# Patient Record
Sex: FEMALE | Race: WHITE | NOT HISPANIC OR LATINO | Employment: FULL TIME | URBAN - NONMETROPOLITAN AREA
[De-identification: names, ages, dates, MRNs, and addresses within clinical notes are randomized per-mention and may not be internally consistent; named-entity substitution may affect disease eponyms.]

---

## 2018-08-08 ENCOUNTER — OFFICE VISIT (OUTPATIENT)
Dept: BEHAVIORAL HEALTH | Facility: CLINIC | Age: 53
End: 2018-08-08

## 2018-08-08 DIAGNOSIS — F90.0 ADHD, PREDOMINANTLY INATTENTIVE TYPE: ICD-10-CM

## 2018-08-08 DIAGNOSIS — F41.1 GENERALIZED ANXIETY DISORDER: Primary | ICD-10-CM

## 2018-08-08 PROCEDURE — 90791 PSYCH DIAGNOSTIC EVALUATION: CPT | Performed by: PSYCHOLOGIST
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
## 2018-08-15 ENCOUNTER — OFFICE VISIT (OUTPATIENT)
Dept: BEHAVIORAL HEALTH | Facility: CLINIC | Age: 53
End: 2018-08-15

## 2018-08-15 DIAGNOSIS — F41.1 GENERALIZED ANXIETY DISORDER: ICD-10-CM

## 2018-08-15 DIAGNOSIS — F90.0 ADHD, PREDOMINANTLY INATTENTIVE TYPE: Primary | ICD-10-CM

## 2018-08-15 PROCEDURE — 90834 PSYTX W PT 45 MINUTES: CPT | Performed by: PSYCHOLOGIST
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
## 2018-10-18 ENCOUNTER — TRANSCRIBE ORDERS (OUTPATIENT)
Dept: MRI IMAGING | Facility: HOSPITAL | Age: 53
End: 2018-10-18

## 2018-10-18 DIAGNOSIS — M54.2 CERVICALGIA: Primary | ICD-10-CM

## 2019-05-28 ENCOUNTER — PROCEDURE VISIT (OUTPATIENT)
Dept: PULMONOLOGY | Facility: CLINIC | Age: 54
End: 2019-05-28

## 2019-05-28 ENCOUNTER — DOCUMENTATION (OUTPATIENT)
Dept: PULMONOLOGY | Facility: CLINIC | Age: 54
End: 2019-05-28

## 2019-05-28 DIAGNOSIS — R05.3 CHRONIC COUGH: Primary | ICD-10-CM

## 2019-05-28 PROCEDURE — 94060 EVALUATION OF WHEEZING: CPT | Performed by: INTERNAL MEDICINE

## 2019-05-28 PROCEDURE — 94729 DIFFUSING CAPACITY: CPT | Performed by: INTERNAL MEDICINE

## 2019-05-28 PROCEDURE — 94727 GAS DIL/WSHOT DETER LNG VOL: CPT | Performed by: INTERNAL MEDICINE

## 2019-05-28 NOTE — PROGRESS NOTES
PFTs: 5/28/19 (independently reviewed and interpreted by me)  Ratio 72  FVC 3.79/ 110%  FEV1 2.74/ 102%  TLC 5.5/ 108%  DLCO 25.13/ 103%    Conclusion:  Mild obstruction with significant bronchodilator response in FEV1 and normalization of spirometry.  Normal lung volumes and diffusing capacity.  No comparative data available.         This document has been electronically signed by Frances Rebolledo MD on May 28, 2019 3:20 PM

## 2019-06-03 DIAGNOSIS — R05.9 COUGH: Primary | ICD-10-CM

## 2019-06-04 NOTE — PROGRESS NOTES
Pulmonary Consultation    Arleth Denis APRN,    Thank you for asking me to see Magdalene Meza for   Chief Complaint   Patient presents with   • Cough   .    Subjective     History of Present Illness  Magdalene Meza is a 54 y.o. female with a PMH significant for allergies, GERD, depression, migraines, ADD, and chronic pain who presents for evaluation of cough. Pt states she has been sick since early April. She has had 2 abx courses and steroids, along with a nebulizer but she has not been able to get over the illness. Pt reports she has had severe cough to the point it caused pain with breathing so she went to . She did take pain medicine which helped relieve her pain. Pt had a CXR at that time was normal and she was told she had pain MSK pain from her cough. She states she could not cough for 2wks due to the pain. She denies prior lung disease. Pt has had minimal sputum production. She reports she usually does homeopathic remedies for her ailments (herbal supplements, acupuncture, and craniosacral massage). Pt states her son was sick prior to onset of her illness. She admits that she is often very fatigued due to work and after school activities. Pt reports her cough is intermittent cough which induced by exposure to heat and sometimes at night. She does try to suppress her cough and will feel nauseated. Pt does state that she feels a lot better. She has been using albuterol nebs for cough and congestion, but not for dyspnea. Pt admits to nasal drainage for which she will use decongestants and anti-histamines prn. She does not like combination medications as she feels they are too much. Pt feels like the anti-histamine dries her out too much. She has noticed increase in spots on her skin. Pt believes stress caused her illnesses. Pt has occasional heart burn, but it has not been bothering her lately. Her PCP did try a PPI but she has not noticed any difference. She has not taken it in a  week with no changes in her cough. She is a never smoker, but her parents smokes. Pt is a school counselor. Her sister has severe asthma and her father has heart problems.    Review of Systems: History obtained from chart review and the patient.  Review of Systems   Constitutional: Positive for fatigue. Negative for fever and unexpected weight change.   HENT: Positive for congestion and postnasal drip. Negative for rhinorrhea and sinus pressure.    Respiratory: Positive for cough. Negative for shortness of breath and wheezing.    Cardiovascular: Positive for chest pain. Negative for leg swelling.   Gastrointestinal: Negative for abdominal pain.   Musculoskeletal: Negative for arthralgias and back pain.   Skin: Positive for rash.     As described in the HPI. Otherwise, remainder of ROS (14 systems) were negative.    Patient Active Problem List   Diagnosis   • Generalized anxiety disorder   • ADHD, predominantly inattentive type   • Allergic rhinitis   • Anxiety   • Chronic migraine without aura   • Depression   • Family history of malignant neoplasm of gastrointestinal tract   • GERD (gastroesophageal reflux disease)   • History of nephrolithiasis   • Insomnia   • S/P laparoscopic cholecystectomy   • Chronic seasonal allergic rhinitis due to pollen   • Mild intermittent asthma without complication         Current Outpatient Medications:   •  albuterol (PROVENTIL) (2.5 MG/3ML) 0.083% nebulizer solution, Inhale 2.5 mg Every 4 (Four) Hours As Needed for Shortness of Air, Wheezing or Cough., Disp: , Rfl:   •  buPROPion XL (WELLBUTRIN XL) 300 MG 24 hr tablet, take 1 tablet by mouth once daily, Disp: , Rfl:   •  cyclobenzaprine (FLEXERIL) 10 MG tablet, Take 10 mg by mouth., Disp: , Rfl:   •  fluticasone (FLONASE) 50 MCG/ACT nasal spray, 50 mcg., Disp: , Rfl:   •  progesterone (PROMETRIUM) 100 MG capsule, , Disp: , Rfl:   •  promethazine (PHENERGAN) 25 MG tablet, take 1/2 to 1 tablet by mouth every 6 hours if needed, Disp:  ", Rfl:   •  Thyroid (ARMOUR THYROID) 30 MG PO tablet, , Disp: , Rfl:   •  topiramate (TOPAMAX) 100 MG tablet, Take 100 mg by mouth Daily., Disp: , Rfl:   •  topiramate (TOPAMAX) 50 MG tablet, TAKE 3 TABLETS BY MOUTH AT BEDTIME, Disp: , Rfl:   •  cetirizine (zyrTEC) 10 MG tablet, Take 10 mg by mouth Daily., Disp: , Rfl:   •  finasteride (PROSCAR) 5 MG tablet, Take 5 mg by mouth Daily., Disp: , Rfl:   •  Ibuprofen 200 MG capsule, Take 200 mg by mouth., Disp: , Rfl:   •  Lisdexamfetamine Dimesylate (VYVANSE PO), Vyvanse, Disp: , Rfl:   •  Tapentadol HCl (NUCYNTA PO), Nucynta, Disp: , Rfl:     Allergies   Allergen Reactions   • Ceclor [Cefaclor] Hives   • Diclofenac GI Intolerance     Causes stomach problems.     • Duloxetine Other (See Comments)     muscle twitching   • Gabapentin Other (See Comments)     Causes patient to retain fluid   • Pregabalin Other (See Comments)     Orbital swelling   • Miconazole Rash       Past Medical History:   Diagnosis Date   • Allergic rhinitis    • GERD (gastroesophageal reflux disease)    • Migraine    • Sinusitis      Past Surgical History:   Procedure Laterality Date   • CHOLECYSTECTOMY     • ENDOMETRIAL ABLATION     • LAPAROSCOPIC TUBAL LIGATION       Social History     Socioeconomic History   • Marital status:      Spouse name: Not on file   • Number of children: Not on file   • Years of education: Not on file   • Highest education level: Not on file   Tobacco Use   • Smoking status: Never Smoker   • Smokeless tobacco: Never Used   Substance and Sexual Activity   • Alcohol use: Yes     Frequency: Monthly or less   • Drug use: No   • Sexual activity: Defer     Family History   Problem Relation Age of Onset   • Cancer Father    • Heart disease Father    • Stroke Father           Objective     Blood pressure 147/96, pulse 92, height 165.1 cm (65\"), weight 64.4 kg (142 lb), SpO2 99 %.  Physical Exam   Constitutional: She is oriented to person, place, and time. Vital signs are " normal. She appears well-developed and well-nourished.   HENT:   Head: Normocephalic and atraumatic.   Nose: Mucosal edema and septal deviation present. No rhinorrhea.   Mouth/Throat: Uvula is midline, oropharynx is clear and moist and mucous membranes are normal.   Mallampati 1, cobblestoning   Eyes: Conjunctivae, EOM and lids are normal. Pupils are equal, round, and reactive to light.   Neck: Trachea normal and normal range of motion. No tracheal tenderness present. No thyroid mass present.   Cardiovascular: Normal rate, regular rhythm and normal heart sounds. PMI is not displaced. Exam reveals no gallop.   No murmur heard.  Pulmonary/Chest: Effort normal and breath sounds normal. No respiratory distress. She has no decreased breath sounds. She has no wheezes. She has no rhonchi. Chest wall is not dull to percussion. She exhibits no tenderness.   Abdominal: Soft. Normal appearance and bowel sounds are normal. There is no hepatomegaly. There is no tenderness.   Musculoskeletal:   Normal gait, no extremity edema     Vascular Status -  Her right foot exhibits no edema. Her left foot exhibits no edema.  Lymphadenopathy:        Head (right side): No submandibular adenopathy present.        Head (left side): No submandibular adenopathy present.     She has no cervical adenopathy.        Right: No supraclavicular adenopathy present.        Left: No supraclavicular adenopathy present.   Neurological: She is alert and oriented to person, place, and time.   Skin: Skin is warm and dry. No rash noted. No cyanosis. Nails show no clubbing.   Psychiatric: She has a normal mood and affect. Her speech is normal and behavior is normal. Judgment normal.   Nursing note and vitals reviewed.      PFTs: 5/28/19 (independently reviewed and interpreted by me)  Ratio 72  FVC 3.79/ 110%  FEV1 2.74/ 102%  TLC 5.5/ 108%  DLCO 25.13/ 103%  Mild obstruction with significant bronchodilator response in FEV1 and normalization of spirometry.   Normal lung volumes and diffusing capacity.  No comparative data available    Radiology (independently reviewed and interpreted by me): CXR 5/12/19 showed NACPD, significant bowel gas       Assessment/Plan     Magdalene was seen today for cough.    Diagnoses and all orders for this visit:    Upper airway cough syndrome    Chronic seasonal allergic rhinitis due to pollen    Mild intermittent asthma without complication         Discussion/ Recommendations:   PFTs showed mild obstruction on pre-spirometry with normalization of FEV1 following bronchodilator administration.  Recent chest x-ray was unremarkable.  I think she likely suffered from upper airway cough syndrome that was triggered by either a viral upper restaurant infection or seasonal allergies.  Her symptoms have significantly improved, but she still has some symptoms related to rhinitis.  I counseled her that the dryness she experienced was likely more due to the decongestant she was using, but antihistamines certainly can also contribute to dryness.  She has not had significant reflux nor changes in her cough with a PPI so have encouraged her to continue off of this.  Otherwise, I have made recommendations as below for treating her persistent rhinitis.  I did  her that she likely has some mild underlying asthma which is mainly to be an issue during allergy season or when she develops a respiratory illness.    -Recommended she start using nasal saline frequently.  Can also use nasal saline rinses with a Daya pot as needed.  -If rhinitis continues to be an issue, recommend she start using fluticasone nasal spray on a daily basis.  Counseled on proper technique.  -Recommended that she use nasal saline gel in her nares if she has irritation or soreness from drainage.  -Okay to use over-the-counter nonsedating antihistamines as needed.  -Cautioned on using decongestants frequently.  -Use albuterol nebs as needed for dyspnea, wheeze, or coughing.  -No  indication for long-acting bronchodilators at this time.  -Okay to continue off PPI.  -Counseled on allergen avoidance.        Patient's Body mass index is 23.63 kg/m². BMI is within normal parameters. No follow-up required..           Return if symptoms worsen or fail to improve.      Thank you for allowing me to participate in the care of Magdalene Meza. Please do not hesitate to contact me with any questions.         This document has been electronically signed by Frances Rebolledo MD on June 5, 2019 9:14 AM      Dictated using Dragon

## 2019-06-05 ENCOUNTER — OFFICE VISIT (OUTPATIENT)
Dept: PULMONOLOGY | Facility: CLINIC | Age: 54
End: 2019-06-05

## 2019-06-05 VITALS
BODY MASS INDEX: 23.66 KG/M2 | HEIGHT: 65 IN | DIASTOLIC BLOOD PRESSURE: 96 MMHG | HEART RATE: 92 BPM | OXYGEN SATURATION: 99 % | WEIGHT: 142 LBS | SYSTOLIC BLOOD PRESSURE: 147 MMHG

## 2019-06-05 DIAGNOSIS — R05.8 UPPER AIRWAY COUGH SYNDROME: Primary | ICD-10-CM

## 2019-06-05 DIAGNOSIS — J30.1 CHRONIC SEASONAL ALLERGIC RHINITIS DUE TO POLLEN: ICD-10-CM

## 2019-06-05 DIAGNOSIS — J45.20 MILD INTERMITTENT ASTHMA WITHOUT COMPLICATION: ICD-10-CM

## 2019-06-05 PROCEDURE — 99204 OFFICE O/P NEW MOD 45 MIN: CPT | Performed by: INTERNAL MEDICINE

## 2019-06-05 RX ORDER — LEVOTHYROXINE AND LIOTHYRONINE 19; 4.5 UG/1; UG/1
TABLET ORAL
COMMUNITY
Start: 2018-05-09 | End: 2020-03-17

## 2019-06-05 RX ORDER — FLUTICASONE PROPIONATE 50 MCG
50 SPRAY, SUSPENSION (ML) NASAL
COMMUNITY
Start: 2014-04-03 | End: 2021-08-31

## 2019-06-05 RX ORDER — PROMETHAZINE HYDROCHLORIDE 25 MG/1
TABLET ORAL
COMMUNITY
Start: 2014-10-08

## 2019-06-05 RX ORDER — OMEGA-3 FATTY ACIDS/FISH OIL 300-1000MG
200 CAPSULE ORAL
COMMUNITY
End: 2020-12-11

## 2019-06-05 RX ORDER — ALBUTEROL SULFATE 2.5 MG/3ML
2.5 SOLUTION RESPIRATORY (INHALATION) EVERY 4 HOURS PRN
COMMUNITY
Start: 2019-05-12 | End: 2020-05-12

## 2019-06-05 RX ORDER — BUPROPION HYDROCHLORIDE 300 MG/1
TABLET ORAL
COMMUNITY
Start: 2015-09-08

## 2019-06-05 RX ORDER — BENZONATATE 100 MG/1
CAPSULE ORAL
Refills: 1 | COMMUNITY
Start: 2019-05-15 | End: 2019-06-05

## 2019-06-05 RX ORDER — TOPIRAMATE 100 MG/1
100 TABLET, FILM COATED ORAL DAILY
COMMUNITY
Start: 2014-03-14 | End: 2020-03-17

## 2019-06-05 RX ORDER — METHYLPREDNISOLONE 4 MG/1
4 TABLET ORAL DAILY
COMMUNITY
End: 2019-06-05

## 2019-06-05 RX ORDER — FINASTERIDE 5 MG/1
5 TABLET, FILM COATED ORAL DAILY
COMMUNITY
End: 2020-03-17

## 2019-06-05 RX ORDER — ESOMEPRAZOLE MAGNESIUM 40 MG/1
40 CAPSULE, DELAYED RELEASE ORAL
COMMUNITY
Start: 2014-01-31 | End: 2019-06-05

## 2019-06-05 RX ORDER — CETIRIZINE HYDROCHLORIDE 10 MG/1
10 TABLET ORAL DAILY
COMMUNITY
End: 2020-07-17

## 2019-06-05 RX ORDER — TOPIRAMATE 50 MG/1
TABLET, FILM COATED ORAL
COMMUNITY
Start: 2018-10-04 | End: 2020-03-17

## 2019-06-05 RX ORDER — CYCLOBENZAPRINE HCL 10 MG
10 TABLET ORAL
COMMUNITY
Start: 2019-01-29 | End: 2020-08-03

## 2020-03-17 ENCOUNTER — OFFICE VISIT (OUTPATIENT)
Dept: ENDOCRINOLOGY | Facility: CLINIC | Age: 55
End: 2020-03-17

## 2020-03-17 ENCOUNTER — APPOINTMENT (OUTPATIENT)
Dept: LAB | Facility: HOSPITAL | Age: 55
End: 2020-03-17

## 2020-03-17 VITALS
OXYGEN SATURATION: 98 % | BODY MASS INDEX: 26.69 KG/M2 | HEART RATE: 98 BPM | WEIGHT: 160.2 LBS | SYSTOLIC BLOOD PRESSURE: 132 MMHG | HEIGHT: 65 IN | DIASTOLIC BLOOD PRESSURE: 80 MMHG

## 2020-03-17 DIAGNOSIS — R94.6 ABNORMAL THYROID FUNCTION TEST: ICD-10-CM

## 2020-03-17 DIAGNOSIS — E28.39 FEMALE HYPOGONADISM: ICD-10-CM

## 2020-03-17 DIAGNOSIS — R79.89 LOW TESTOSTERONE LEVEL IN FEMALE: Primary | ICD-10-CM

## 2020-03-17 LAB
ALBUMIN SERPL-MCNC: 4.7 G/DL (ref 3.5–5.2)
ALBUMIN/GLOB SERPL: 2.4 G/DL
ALP SERPL-CCNC: 86 U/L (ref 39–117)
ALT SERPL W P-5'-P-CCNC: 17 U/L (ref 1–33)
ANION GAP SERPL CALCULATED.3IONS-SCNC: 9.9 MMOL/L (ref 5–15)
AST SERPL-CCNC: 20 U/L (ref 1–32)
BASOPHILS # BLD AUTO: 0.09 10*3/MM3 (ref 0–0.2)
BASOPHILS NFR BLD AUTO: 1.1 % (ref 0–1.5)
BILIRUB SERPL-MCNC: 0.2 MG/DL (ref 0.2–1.2)
BUN BLD-MCNC: 10 MG/DL (ref 6–20)
BUN/CREAT SERPL: 10.6 (ref 7–25)
CALCIUM SPEC-SCNC: 9.4 MG/DL (ref 8.6–10.5)
CHLORIDE SERPL-SCNC: 103 MMOL/L (ref 98–107)
CO2 SERPL-SCNC: 25.1 MMOL/L (ref 22–29)
CREAT BLD-MCNC: 0.94 MG/DL (ref 0.57–1)
DEPRECATED RDW RBC AUTO: 39.3 FL (ref 37–54)
EOSINOPHIL # BLD AUTO: 0.23 10*3/MM3 (ref 0–0.4)
EOSINOPHIL NFR BLD AUTO: 2.8 % (ref 0.3–6.2)
ERYTHROCYTE [DISTWIDTH] IN BLOOD BY AUTOMATED COUNT: 12.7 % (ref 12.3–15.4)
GFR SERPL CREATININE-BSD FRML MDRD: 62 ML/MIN/1.73
GLOBULIN UR ELPH-MCNC: 2 GM/DL
GLUCOSE BLD-MCNC: 109 MG/DL (ref 65–99)
HCT VFR BLD AUTO: 42.2 % (ref 34–46.6)
HGB BLD-MCNC: 14.4 G/DL (ref 12–15.9)
IMM GRANULOCYTES # BLD AUTO: 0.03 10*3/MM3 (ref 0–0.05)
IMM GRANULOCYTES NFR BLD AUTO: 0.4 % (ref 0–0.5)
LYMPHOCYTES # BLD AUTO: 1.97 10*3/MM3 (ref 0.7–3.1)
LYMPHOCYTES NFR BLD AUTO: 24.3 % (ref 19.6–45.3)
MCH RBC QN AUTO: 29.6 PG (ref 26.6–33)
MCHC RBC AUTO-ENTMCNC: 34.1 G/DL (ref 31.5–35.7)
MCV RBC AUTO: 86.8 FL (ref 79–97)
MONOCYTES # BLD AUTO: 0.7 10*3/MM3 (ref 0.1–0.9)
MONOCYTES NFR BLD AUTO: 8.6 % (ref 5–12)
NEUTROPHILS # BLD AUTO: 5.08 10*3/MM3 (ref 1.7–7)
NEUTROPHILS NFR BLD AUTO: 62.8 % (ref 42.7–76)
NRBC BLD AUTO-RTO: 0 /100 WBC (ref 0–0.2)
PLATELET # BLD AUTO: 338 10*3/MM3 (ref 140–450)
PMV BLD AUTO: 10.2 FL (ref 6–12)
POTASSIUM BLD-SCNC: 4.4 MMOL/L (ref 3.5–5.2)
PROGEST SERPL-MCNC: 13.2 NG/ML
PROT SERPL-MCNC: 6.7 G/DL (ref 6–8.5)
RBC # BLD AUTO: 4.86 10*6/MM3 (ref 3.77–5.28)
SODIUM BLD-SCNC: 138 MMOL/L (ref 136–145)
T3FREE SERPL-MCNC: 3.45 PG/ML (ref 2–4.4)
T4 FREE SERPL-MCNC: 1.23 NG/DL (ref 0.93–1.7)
TSH SERPL DL<=0.05 MIU/L-ACNC: 1.8 UIU/ML (ref 0.27–4.2)
WBC NRBC COR # BLD: 8.1 10*3/MM3 (ref 3.4–10.8)

## 2020-03-17 PROCEDURE — 80053 COMPREHEN METABOLIC PANEL: CPT | Performed by: INTERNAL MEDICINE

## 2020-03-17 PROCEDURE — 82627 DEHYDROEPIANDROSTERONE: CPT | Performed by: INTERNAL MEDICINE

## 2020-03-17 PROCEDURE — 82679 ASSAY OF ESTRONE: CPT | Performed by: INTERNAL MEDICINE

## 2020-03-17 PROCEDURE — 86376 MICROSOMAL ANTIBODY EACH: CPT | Performed by: INTERNAL MEDICINE

## 2020-03-17 PROCEDURE — 99204 OFFICE O/P NEW MOD 45 MIN: CPT | Performed by: INTERNAL MEDICINE

## 2020-03-17 PROCEDURE — 84481 FREE ASSAY (FT-3): CPT | Performed by: INTERNAL MEDICINE

## 2020-03-17 PROCEDURE — 36415 COLL VENOUS BLD VENIPUNCTURE: CPT | Performed by: INTERNAL MEDICINE

## 2020-03-17 PROCEDURE — 82670 ASSAY OF TOTAL ESTRADIOL: CPT | Performed by: INTERNAL MEDICINE

## 2020-03-17 PROCEDURE — 85025 COMPLETE CBC W/AUTO DIFF WBC: CPT | Performed by: INTERNAL MEDICINE

## 2020-03-17 PROCEDURE — 84443 ASSAY THYROID STIM HORMONE: CPT | Performed by: INTERNAL MEDICINE

## 2020-03-17 PROCEDURE — 84403 ASSAY OF TOTAL TESTOSTERONE: CPT | Performed by: INTERNAL MEDICINE

## 2020-03-17 PROCEDURE — 84439 ASSAY OF FREE THYROXINE: CPT | Performed by: INTERNAL MEDICINE

## 2020-03-17 PROCEDURE — 84144 ASSAY OF PROGESTERONE: CPT | Performed by: INTERNAL MEDICINE

## 2020-03-17 RX ORDER — VALACYCLOVIR HYDROCHLORIDE 1 G/1
TABLET, FILM COATED ORAL
COMMUNITY
Start: 2020-02-13

## 2020-03-17 RX ORDER — SPIRONOLACTONE 50 MG/1
25 TABLET, FILM COATED ORAL DAILY
COMMUNITY
End: 2020-07-17

## 2020-03-17 NOTE — PATIENT INSTRUCTIONS
Concerns of hormone replacement    - past 60     - window hypothesis ( 10 years off and then on )    - estrogen plus progesterone -- breast cancer    - estrogen alone -- DVT, PE, stroke    - you have to give progesterone to protect the endometrium from estrogen induced uterine cancer. Studied dose is 100 mg   So 300 mg ?    -     Testosterone      possible risks - retain salt - raises blood pressure    If estrogen causes clots , does testosterone cause it ?    If progesterone causes breast cancer , does testosterone cause it ?    A normal female testosterone is 10 to 70     A normal male is 300 to 900    So when you induce levels in between it may enlarge other muscles that you don't want such as the heart.     You have hirsutism -- testosterone induced    Aldactone is a testosterone blocker and she is giving it . So why would you give testosterone and block it. Might as well give less testosterone     Pellets give a peak and a trough    It seems to be that testosterone is providing good psychological benefits at the possible expense of health risks     Let's try to the same but with lesser peaks     ----    Let us try    Testosterone gel 1%, apply 1 mg of testosterone daily ( a man usually gets between 5 to 10 mg )\    Estrogen can be given as a patch that gives between 25 to 100 mcgs daily . Let's start with 100 mcg patch to change twice weekly   Alternative estrogen valerate 20 to 40 mg IM monthly     Prometrium 100 mg daily to protect the endometrium      mg daily to try to convert more of the estrogen into estradiol rather than estrone

## 2020-03-17 NOTE — PROGRESS NOTES
Magdalene Meza is a 54 y.o. female who presents for  evaluation of   Chief Complaint   Patient presents with   • Low Female Testosterone       Referring provider     Primary Care Provider    Sita Harkins APRN    55 yo female     Low Female Testosterone    Duration, Since age 49 years old at the onset of menopause    Symptoms, Fatigue / Weight Gain     Modifying Factors, on Testosterone Pellets with improvement of symptoms but concerned for safter               Past Medical History:   Diagnosis Date   • Allergic rhinitis    • Female hypogonadism 3/17/2020   • GERD (gastroesophageal reflux disease)    • Low testosterone level in female 3/17/2020   • Migraine    • Sinusitis      Family History   Problem Relation Age of Onset   • Cancer Father    • Heart disease Father    • Stroke Father      Social History     Tobacco Use   • Smoking status: Never Smoker   • Smokeless tobacco: Never Used   Substance Use Topics   • Alcohol use: Yes     Frequency: Monthly or less   • Drug use: No         Current Outpatient Medications:   •  albuterol (PROVENTIL) (2.5 MG/3ML) 0.083% nebulizer solution, Inhale 2.5 mg Every 4 (Four) Hours As Needed for Shortness of Air, Wheezing or Cough., Disp: , Rfl:   •  buPROPion XL (WELLBUTRIN XL) 300 MG 24 hr tablet, take 1 tablet by mouth once daily, Disp: , Rfl:   •  cetirizine (zyrTEC) 10 MG tablet, Take 10 mg by mouth Daily., Disp: , Rfl:   •  cyclobenzaprine (FLEXERIL) 10 MG tablet, Take 10 mg by mouth., Disp: , Rfl:   •  fluticasone (FLONASE) 50 MCG/ACT nasal spray, 50 mcg., Disp: , Rfl:   •  Ibuprofen 200 MG capsule, Take 200 mg by mouth., Disp: , Rfl:   •  Lisdexamfetamine Dimesylate (VYVANSE PO), Vyvanse, Disp: , Rfl:   •  progesterone (PROMETRIUM) 100 MG capsule, 100 mg po daily, Disp: 30 capsule, Rfl: 11  •  promethazine (PHENERGAN) 25 MG tablet, take 1/2 to 1 tablet by mouth every 6 hours if needed, Disp: , Rfl:   •  spironolactone (ALDACTONE) 50 MG tablet, Take 25  mg by mouth Daily., Disp: , Rfl:   •  Tapentadol HCl (NUCYNTA PO), Nucynta, Disp: , Rfl:   •  valACYclovir (VALTREX) 1000 MG tablet, TK 2 TS PO BID FOR 1 DAY, Disp: , Rfl:   •  estradiol (Climara) 0.1 MG/24HR patch, Place 1 patch on the skin as directed by provider 1 (One) Time Per Week., Disp: 4 patch, Rfl: 11    Review of Systems    Review of Systems   Constitutional: Positive for diaphoresis, fatigue and unexpected weight change. Negative for activity change, appetite change, chills and fever.   HENT: Negative for congestion, dental problem, drooling, ear discharge, ear pain, facial swelling, mouth sores, postnasal drip, rhinorrhea, sinus pressure, sore throat, tinnitus, trouble swallowing and voice change.    Eyes: Negative for photophobia, pain, discharge, redness, itching and visual disturbance.   Respiratory: Negative for apnea, cough, choking, chest tightness, shortness of breath, wheezing and stridor.    Cardiovascular: Negative for chest pain, palpitations and leg swelling.   Gastrointestinal: Negative for abdominal distention, abdominal pain, constipation, diarrhea, nausea and vomiting.   Endocrine: Negative for cold intolerance, heat intolerance, polydipsia, polyphagia and polyuria.        Low Libido    Genitourinary: Negative for decreased urine volume, difficulty urinating, dysuria, flank pain, frequency, hematuria and urgency.   Musculoskeletal: Negative for arthralgias, back pain, gait problem, joint swelling, myalgias, neck pain and neck stiffness.   Skin: Negative for color change, pallor, rash and wound.   Allergic/Immunologic: Negative for immunocompromised state.   Neurological: Positive for numbness. Negative for dizziness, tremors, seizures, syncope, facial asymmetry, speech difficulty, weakness, light-headedness and headaches.   Hematological: Negative for adenopathy.   Psychiatric/Behavioral: Negative for agitation, behavioral problems, confusion, decreased concentration, dysphoric mood,  "hallucinations, self-injury, sleep disturbance and suicidal ideas. The patient is not nervous/anxious and is not hyperactive.         Objective:   /80   Pulse 98   Ht 165.1 cm (65\")   Wt 72.7 kg (160 lb 3.2 oz)   SpO2 98%   BMI 26.66 kg/m²     Physical Exam   Constitutional: She is oriented to person, place, and time. She appears well-developed.   HENT:   Head: Normocephalic.   Right Ear: External ear normal.   Left Ear: External ear normal.   Nose: Nose normal.   Eyes: Conjunctivae and EOM are normal. No scleral icterus.   Neck: Normal range of motion. Neck supple. No tracheal deviation present. No thyromegaly present.   Cardiovascular: Normal rate, regular rhythm, normal heart sounds and intact distal pulses. Exam reveals no gallop and no friction rub.   No murmur heard.  Pulmonary/Chest: Effort normal and breath sounds normal. No stridor. No respiratory distress. She has no wheezes. She has no rales. She exhibits no tenderness.   Abdominal: Soft. Bowel sounds are normal. She exhibits no distension and no mass. There is no tenderness. There is no rebound and no guarding.   Musculoskeletal: Normal range of motion. She exhibits no tenderness or deformity.   Lymphadenopathy:     She has no cervical adenopathy.   Neurological: She is alert and oriented to person, place, and time. She displays normal reflexes. She exhibits normal muscle tone. Coordination normal.   Skin: No rash noted. No erythema. No pallor.   Psychiatric: She has a normal mood and affect. Her behavior is normal. Judgment and thought content normal.       Lab Review    Results for orders placed or performed during the hospital encounter of 11/18/15   Estradiol   Result Value Ref Range    Estradiol 9.7 pg/mL   Follicle stimulating hormone   Result Value Ref Range    .0 mIU/mL   Luteinizing hormone   Result Value Ref Range    LH 85.8 mIU/mL         Assessment/Plan       ICD-10-CM ICD-9-CM   1. Low testosterone level in female R79.89 " 790.99   2. Female hypogonadism E28.39 256.39   3. Abnormal thyroid function test R94.6 794.5         I reviewed and summarized records from Sita Harkins APRN from current year  and I reviewed / ordered labs.   From review of records :    Pt has female hypogonadism, low female testosterone    Has been on testosterone pellets provided by Dr. Damico every 6 months    Testosterone levels have varied between 75 to 350     She has had improvement of symptoms but uncertainty of safety.    I informed there is no data on safety of testosterone replacement in women . Nevertheless , I can provide replacement with transdermal testosterone that can achieve more consistent testosterone levels without such high peaks.    Start 1% TD testosterone 1 mg daily   Instead of prometrium 300 mg daily - do 100 mg daily   Instead of estrogen pellets , try 100 mcg estradiol patch   Continue DIM    ---      She was on armour but TSH has been normal   TPO ab negative   Recheck thyroid levels     appt every 4 months , labs as often as monthly     Orders Placed This Encounter   Procedures   • Testosterone, Total, Women & Children   • Progesterone   • Estrogens, Fractionated   • DHEA-Sulfate   • T4, Free   • T3, Free   • TSH   • Thyroid Peroxidase Antibody   • Comprehensive Metabolic Panel   • CBC Auto Differential   • CBC & Differential     Order Specific Question:   Manual Differential     Answer:   No         A copy of my note was sent to Sita Harkins APRN    Please see my above opinion and suggestions.             This document has been electronically signed by Micky Donald MD on March 17, 2020 14:21        Concerns of hormone replacement    - past 60     - window hypothesis ( 10 years off and then on )    - estrogen plus progesterone -- breast cancer    - estrogen alone -- DVT, PE, stroke    - you have to give progesterone to protect the endometrium from estrogen induced uterine cancer. Studied  dose is 100 mg   So 300 mg ?    -     Testosterone      possible risks - retain salt - raises blood pressure    If estrogen causes clots , does testosterone cause it ?    If progesterone causes breast cancer , does testosterone cause it ?    A normal female testosterone is 10 to 70     A normal male is 300 to 900    So when you induce levels in between it may enlarge other muscles that you don't want such as the heart.     You have hirsutism -- testosterone induced    Aldactone is a testosterone blocker and she is giving it . So why would you give testosterone and block it. Might as well give less testosterone     Pellets give a peak and a trough    It seems to be that testosterone is providing good psychological benefits at the possible expense of health risks     Let's try to the same but with lesser peaks     ----    Let us try    Testosterone gel 1%, apply 1 mg of testosterone daily ( a man usually gets between 5 to 10 mg )\    Estrogen can be given as a patch that gives between 25 to 100 mcgs daily . Let's start with 100 mcg patch to change twice weekly   Alternative estrogen valerate 20 to 40 mg IM monthly     Prometrium 100 mg daily to protect the endometrium      mg daily to try to convert more of the estrogen into estradiol rather than estrone

## 2020-03-18 LAB
DHEA-S SERPL-MCNC: 177.3 UG/DL (ref 29.4–220.5)
THYROPEROXIDASE AB SERPL-ACNC: 21 IU/ML (ref 0–34)

## 2020-03-19 LAB — TESTOST SERPL-MCNC: 119 NG/DL

## 2020-03-23 DIAGNOSIS — R79.89 LOW TESTOSTERONE LEVEL IN FEMALE: Primary | ICD-10-CM

## 2020-03-24 LAB
ESTRADIOL SERPL HS-MCNC: 77.7 PG/ML
ESTRONE SERPL-MCNC: NORMAL PG/ML

## 2020-04-09 ENCOUNTER — TELEPHONE (OUTPATIENT)
Dept: ENDOCRINOLOGY | Facility: CLINIC | Age: 55
End: 2020-04-09

## 2020-04-09 NOTE — TELEPHONE ENCOUNTER
"Pt wanted me to see if she could be alerted by phone when her message has been reviewed and a decision is made, so that she only has to go to the pharmacy one time.     Her original message:   (\"Are there other dosing methods for estrogen besides the patch? I am not having much luck with the patches. The first one lasted 1 1/2 days so I went so far as to complete tape over the second one and it stayed on almost a week. I did the same with the 3rd one and it still came off after 3 days.\")     "

## 2020-04-09 NOTE — TELEPHONE ENCOUNTER
"Pt wanted me to see if she could be alerted by phone when her message has been reviewed and a decision is made, so that she only has to go to the pharmacy one time.      Her original message:   (\"Are there other dosing methods for estrogen besides the patch? I am not having much luck with the patches. The first one lasted 1 1/2 days so I went so far as to complete tape over the second one and it stayed on almost a week. I did the same with the 3rd one and it still came off after 3 days.\")          I sent this [employer] message to you, she is now calling  "

## 2020-04-10 ENCOUNTER — TREATMENT (OUTPATIENT)
Dept: ENDOCRINOLOGY | Facility: CLINIC | Age: 55
End: 2020-04-10

## 2020-04-10 DIAGNOSIS — E28.39 FEMALE HYPOGONADISM: ICD-10-CM

## 2020-04-10 DIAGNOSIS — R79.89 LOW TESTOSTERONE LEVEL IN FEMALE: Primary | ICD-10-CM

## 2020-04-10 PROCEDURE — G2012 BRIEF CHECK IN BY MD/QHP: HCPCS | Performed by: INTERNAL MEDICINE

## 2020-04-10 RX ORDER — ESTRADIOL VALERATE 40 MG/ML
20 INJECTION INTRAMUSCULAR
Qty: 1 EACH | Refills: 11 | Status: SHIPPED | OUTPATIENT
Start: 2020-04-10 | End: 2020-11-21 | Stop reason: SDUPTHER

## 2020-04-10 NOTE — PROGRESS NOTES
ICD-10-CM ICD-9-CM   1. Low testosterone level in female R79.89 790.99   2. Female hypogonadism E28.39 256.39     Virtual Check In    Patient contacted us through My Chart to ask for recommendations regarding estradiol use.     She can't tolerate estradiol  Patches, they don't stick and irritate    I advised use of estradiol valerate 20 mg IM monthly     Total time of conversation , between my chart messaging and pharmacy prescription , 16 minutes          This document has been electronically signed by Micky Donald MD on April 10, 2020 14:30    I have finally called in the rx    It will be 20 mg of estradiol valerate monthly   It is Intramuscular , gluteal area better     If you feel that it doesn't last the full month, we can do labs when you feel symptomatic to measure the estradiol levels and we could change it to 20 mg twice monthly or every 3 weeks depending on the levels.    Thank you and sorry for the delay   ===View-only below this line===      ----- Message -----     From: Magdalene Meza     Sent: 4/10/2020  9:53 AM CDT       To: Micky Donald MD  Subject: RE: Prescription Question    Yes!    ----- Message -----  From: Micky Donald MD  Sent: 4/9/20, 5:24 PM  To: Magdalene Meza  Subject: RE: Prescription Question    Would you be willing to inject estradiol , it is 20 to 40 mg once monthly     Cost is about 200 dollars a vial but you get about 3 to 5 doses out of each vial     ----- Message -----     From: Magdalene Meza     Sent: 4/7/2020  9:24 PM CDT       To: Micky Donald MD  Subject: Prescription Question    Are there other dosing methods for estrogen besides the patch? I am not having much luck with the patches. The first one lasted 1 1/2 days so I went so far as to complete tape over the second one and it stayed on almost a week. I did the same with the 3rd one and it still came off after 3 days.

## 2020-05-11 RX ORDER — SYRINGE W-NEEDLE,DISPOSAB,3 ML 25GX5/8"
SYRINGE, EMPTY DISPOSABLE MISCELLANEOUS
Qty: 4 EACH | Refills: 11 | Status: SHIPPED | OUTPATIENT
Start: 2020-05-11 | End: 2021-08-31 | Stop reason: SDUPTHER

## 2020-07-16 ENCOUNTER — PREP FOR SURGERY (OUTPATIENT)
Dept: OTHER | Facility: HOSPITAL | Age: 55
End: 2020-07-16

## 2020-07-16 DIAGNOSIS — Z12.11 SCREEN FOR COLON CANCER: Primary | ICD-10-CM

## 2020-07-16 RX ORDER — DEXTROSE AND SODIUM CHLORIDE 5; .45 G/100ML; G/100ML
100 INJECTION, SOLUTION INTRAVENOUS CONTINUOUS PRN
Status: CANCELLED | OUTPATIENT
Start: 2020-07-24

## 2020-07-17 ENCOUNTER — OFFICE VISIT (OUTPATIENT)
Dept: ENDOCRINOLOGY | Facility: CLINIC | Age: 55
End: 2020-07-17

## 2020-07-17 VITALS
WEIGHT: 151.6 LBS | HEART RATE: 104 BPM | OXYGEN SATURATION: 97 % | HEIGHT: 65 IN | SYSTOLIC BLOOD PRESSURE: 132 MMHG | BODY MASS INDEX: 25.26 KG/M2 | DIASTOLIC BLOOD PRESSURE: 88 MMHG

## 2020-07-17 DIAGNOSIS — E28.39 FEMALE HYPOGONADISM: ICD-10-CM

## 2020-07-17 DIAGNOSIS — M13.0 POLYARTHRITIS: ICD-10-CM

## 2020-07-17 DIAGNOSIS — M20.5X9 FIBULAR DEVIATION OF TOE: ICD-10-CM

## 2020-07-17 DIAGNOSIS — R79.89 LOW TESTOSTERONE LEVEL IN FEMALE: Primary | ICD-10-CM

## 2020-07-17 PROCEDURE — 99214 OFFICE O/P EST MOD 30 MIN: CPT | Performed by: INTERNAL MEDICINE

## 2020-07-17 NOTE — PROGRESS NOTES
" Magdalene Meza is a 55 y.o. female who presents for  evaluation of   Chief Complaint   Patient presents with   • Follow-up     4 mo mariola        Referring provider     Primary Care Provider    Sita Harkins, JULITA    55 yo female     Low Female Testosterone    Duration, Since age 49 years old at the onset of menopause    Symptoms, Fatigue / Weight Gain - now lost 10 lb , has been gardening     Modifying Factors, on Testosterone Pellets with improvement of symptoms but concerned for safety. swtiched to gel , not using    But using estradiol valerate               Past Medical History:   Diagnosis Date   • Allergic rhinitis    • Female hypogonadism 3/17/2020   • GERD (gastroesophageal reflux disease)    • Low testosterone level in female 3/17/2020   • Migraine    • Sinusitis      Family History   Problem Relation Age of Onset   • Cancer Father    • Heart disease Father    • Stroke Father      Social History     Tobacco Use   • Smoking status: Never Smoker   • Smokeless tobacco: Never Used   Substance Use Topics   • Alcohol use: Yes     Frequency: Monthly or less   • Drug use: No         Current Outpatient Medications:   •  buPROPion XL (WELLBUTRIN XL) 300 MG 24 hr tablet, take 1 tablet by mouth once daily, Disp: , Rfl:   •  cyclobenzaprine (FLEXERIL) 10 MG tablet, Take 10 mg by mouth., Disp: , Rfl:   •  estradiol valerate (DELESTROGEN) 40 MG/ML injection, Inject 0.5 mL into the appropriate muscle as directed by prescriber Every 28 (Twenty-Eight) Days. #1, 18 G / 22 g / 3 cc syringe, Disp: 1 each, Rfl: 11  •  Lisdexamfetamine Dimesylate (VYVANSE PO), Take 50 mg by mouth Daily., Disp: , Rfl:   •  Needle, Disp, 18G X 1\" misc, Use   as indicated, Disp: 4 each, Rfl: 11  •  progesterone (PROMETRIUM) 100 MG capsule, 100 mg po daily (Patient taking differently: 100 mg. 100 mg po daily), Disp: 30 capsule, Rfl: 11  •  Syringe 22G X 1\" 3 ML misc, Use  as indicated, Disp: 4 each, Rfl: 11  •  fluticasone " (FLONASE) 50 MCG/ACT nasal spray, 50 mcg., Disp: , Rfl:   •  Ibuprofen 200 MG capsule, Take 200 mg by mouth., Disp: , Rfl:   •  promethazine (PHENERGAN) 25 MG tablet, take 1/2 to 1 tablet by mouth every 6 hours if needed, Disp: , Rfl:   •  valACYclovir (VALTREX) 1000 MG tablet, TK 2 TS PO BID FOR 1 DAY, Disp: , Rfl:     Review of Systems    Review of Systems   Constitutional: Positive for diaphoresis, fatigue and unexpected weight change. Negative for activity change, appetite change, chills and fever.   HENT: Negative for congestion, dental problem, drooling, ear discharge, ear pain, facial swelling, mouth sores, postnasal drip, rhinorrhea, sinus pressure, sore throat, tinnitus, trouble swallowing and voice change.    Eyes: Negative for photophobia, pain, discharge, redness, itching and visual disturbance.   Respiratory: Negative for apnea, cough, choking, chest tightness, shortness of breath, wheezing and stridor.    Cardiovascular: Negative for chest pain, palpitations and leg swelling.   Gastrointestinal: Negative for abdominal distention, abdominal pain, constipation, diarrhea, nausea and vomiting.   Endocrine: Negative for cold intolerance, heat intolerance, polydipsia, polyphagia and polyuria.        Low Libido    Genitourinary: Negative for decreased urine volume, difficulty urinating, dysuria, flank pain, frequency, hematuria and urgency.   Musculoskeletal: Negative for arthralgias, back pain, gait problem, joint swelling, myalgias, neck pain and neck stiffness.   Skin: Negative for color change, pallor, rash and wound.   Allergic/Immunologic: Negative for immunocompromised state.   Neurological: Positive for numbness. Negative for dizziness, tremors, seizures, syncope, facial asymmetry, speech difficulty, weakness, light-headedness and headaches.   Hematological: Negative for adenopathy.   Psychiatric/Behavioral: Negative for agitation, behavioral problems, confusion, decreased concentration, dysphoric  "mood, hallucinations, self-injury, sleep disturbance and suicidal ideas. The patient is not nervous/anxious and is not hyperactive.         Objective:   /88 (BP Location: Right arm, Patient Position: Sitting, Cuff Size: Large Adult)   Pulse 104   Ht 165.1 cm (65\")   Wt 68.8 kg (151 lb 9.6 oz)   SpO2 97%   BMI 25.23 kg/m²     Physical Exam   Constitutional: She is oriented to person, place, and time. She appears well-developed.   HENT:   Head: Normocephalic.   Right Ear: External ear normal.   Left Ear: External ear normal.   Nose: Nose normal.   Eyes: Conjunctivae and EOM are normal. No scleral icterus.   Neck: Normal range of motion. Neck supple. No tracheal deviation present. No thyromegaly present.   Cardiovascular: Normal rate, regular rhythm, normal heart sounds and intact distal pulses. Exam reveals no gallop and no friction rub.   No murmur heard.  Pulmonary/Chest: Effort normal and breath sounds normal. No stridor. No respiratory distress. She has no wheezes. She has no rales. She exhibits no tenderness.   Abdominal: Soft. Bowel sounds are normal. She exhibits no distension and no mass. There is no tenderness. There is no rebound and no guarding.   Musculoskeletal: Normal range of motion. She exhibits no tenderness or deformity.    Diabetic foot exam performed: toe deviation, painful   Lymphadenopathy:     She has no cervical adenopathy.   Neurological: She is alert and oriented to person, place, and time. She displays normal reflexes. She exhibits normal muscle tone. Coordination normal.   Skin: No rash noted. No erythema. No pallor.   Psychiatric: She has a normal mood and affect. Her behavior is normal. Judgment and thought content normal.       Lab Review    Results for orders placed or performed in visit on 03/17/20   Testosterone, Total, Women & Children   Result Value Ref Range    Testosterone, Total 119.0 ng/dL   Progesterone   Result Value Ref Range    Progesterone 13.20 ng/mL "   Estrogens, Fractionated   Result Value Ref Range    Estrone      Estradiol 77.7 pg/mL   DHEA-Sulfate   Result Value Ref Range    DHEA-Sulfate 177.3 29.4 - 220.5 ug/dL   T4, Free   Result Value Ref Range    Free T4 1.23 0.93 - 1.70 ng/dL   T3, Free   Result Value Ref Range    T3, Free 3.45 2.00 - 4.40 pg/mL   TSH   Result Value Ref Range    TSH 1.800 0.270 - 4.200 uIU/mL   Thyroid Peroxidase Antibody   Result Value Ref Range    Thyroid Peroxidase Antibody 21 0 - 34 IU/mL   Comprehensive Metabolic Panel   Result Value Ref Range    Glucose 109 (H) 65 - 99 mg/dL    BUN 10 6 - 20 mg/dL    Creatinine 0.94 0.57 - 1.00 mg/dL    Sodium 138 136 - 145 mmol/L    Potassium 4.4 3.5 - 5.2 mmol/L    Chloride 103 98 - 107 mmol/L    CO2 25.1 22.0 - 29.0 mmol/L    Calcium 9.4 8.6 - 10.5 mg/dL    Total Protein 6.7 6.0 - 8.5 g/dL    Albumin 4.70 3.50 - 5.20 g/dL    ALT (SGPT) 17 1 - 33 U/L    AST (SGOT) 20 1 - 32 U/L    Alkaline Phosphatase 86 39 - 117 U/L    Total Bilirubin 0.2 0.2 - 1.2 mg/dL    eGFR Non African Amer 62 >60 mL/min/1.73    Globulin 2.0 gm/dL    A/G Ratio 2.4 g/dL    BUN/Creatinine Ratio 10.6 7.0 - 25.0    Anion Gap 9.9 5.0 - 15.0 mmol/L   CBC Auto Differential   Result Value Ref Range    WBC 8.10 3.40 - 10.80 10*3/mm3    RBC 4.86 3.77 - 5.28 10*6/mm3    Hemoglobin 14.4 12.0 - 15.9 g/dL    Hematocrit 42.2 34.0 - 46.6 %    MCV 86.8 79.0 - 97.0 fL    MCH 29.6 26.6 - 33.0 pg    MCHC 34.1 31.5 - 35.7 g/dL    RDW 12.7 12.3 - 15.4 %    RDW-SD 39.3 37.0 - 54.0 fl    MPV 10.2 6.0 - 12.0 fL    Platelets 338 140 - 450 10*3/mm3    Neutrophil % 62.8 42.7 - 76.0 %    Lymphocyte % 24.3 19.6 - 45.3 %    Monocyte % 8.6 5.0 - 12.0 %    Eosinophil % 2.8 0.3 - 6.2 %    Basophil % 1.1 0.0 - 1.5 %    Immature Grans % 0.4 0.0 - 0.5 %    Neutrophils, Absolute 5.08 1.70 - 7.00 10*3/mm3    Lymphocytes, Absolute 1.97 0.70 - 3.10 10*3/mm3    Monocytes, Absolute 0.70 0.10 - 0.90 10*3/mm3    Eosinophils, Absolute 0.23 0.00 - 0.40 10*3/mm3     Basophils, Absolute 0.09 0.00 - 0.20 10*3/mm3    Immature Grans, Absolute 0.03 0.00 - 0.05 10*3/mm3    nRBC 0.0 0.0 - 0.2 /100 WBC         Assessment/Plan       ICD-10-CM ICD-9-CM   1. Low testosterone level in female R79.89 790.99   2. Female hypogonadism E28.39 256.39   3. Abnormal thyroid function test R94.6 794.5         I reviewed and summarized records from Sita Harkins APRN from current year  and I reviewed / ordered labs.   From review of records :    Pt has female hypogonadism, low female testosterone    Has been on testosterone pellets provided by Dr. Damico every 6 months    Testosterone levels have varied between 75 to 350     She has had improvement of symptoms but uncertainty of safety.    I informed there is no data on safety of testosterone replacement in women . Nevertheless , I can provide replacement with transdermal testosterone that can achieve more consistent testosterone levels without such high peaks.    Start 1% TD testosterone 1 mg daily - hasn't been using too much since it gets on clothes, suggest to apply inner labia  Instead of prometrium 300 mg daily - do 100 mg daily   Instead of estrogen pellets , try 100 mcg estradiol patch - now using estradiol 20 mg every 30 days, suggest 20 mg every 3 weeks   Continue DIM     estradiol 20 mg IM  , 3 weeks post 300s  Suggest to try 10 mg -- labs in 1 week     Testosterone , inner labia , 4 clicks = 0.25 mg per click , levels 23.9   ---      She was on armour but TSH has been normal   TPO ab negative   Recheck thyroid levels - normal     appt every 4 months , labs as often as monthly     --    Has polyarthritis     Workup as below     No orders of the defined types were placed in this encounter.        A copy of my note was sent to Sita Harkins APRN    Please see my above opinion and suggestions.             This document has been electronically signed by Micky Donald MD on July 17, 2020 13:54         Concerns of hormone replacement    - past 60     - window hypothesis ( 10 years off and then on )    - estrogen plus progesterone -- breast cancer    - estrogen alone -- DVT, PE, stroke    - you have to give progesterone to protect the endometrium from estrogen induced uterine cancer. Studied dose is 100 mg   So 300 mg ?    -     Testosterone      possible risks - retain salt - raises blood pressure    If estrogen causes clots , does testosterone cause it ?    If progesterone causes breast cancer , does testosterone cause it ?    A normal female testosterone is 10 to 70     A normal male is 300 to 900    So when you induce levels in between it may enlarge other muscles that you don't want such as the heart.     You have hirsutism -- testosterone induced    Aldactone is a testosterone blocker and she is giving it . So why would you give testosterone and block it. Might as well give less testosterone     Pellets give a peak and a trough    It seems to be that testosterone is providing good psychological benefits at the possible expense of health risks     Let's try to the same but with lesser peaks     ----    Let us try    Testosterone gel 1%, apply 1 mg of testosterone daily ( a man usually gets between 5 to 10 mg )\    Estrogen can be given as a patch that gives between 25 to 100 mcgs daily . Let's start with 100 mcg patch to change twice weekly   Alternative estrogen valerate 20 to 40 mg IM monthly     Prometrium 100 mg daily to protect the endometrium      mg daily to try to convert more of the estrogen into estradiol rather than estrone    ---    Toe deviation , right foot     Painful in metatarsal area    Refer to podiatry     --    Start vyleesi for HSDD

## 2020-07-21 PROBLEM — F52.0 HYPOACTIVE SEXUAL DESIRE DISORDER: Status: ACTIVE | Noted: 2020-07-21

## 2020-07-21 PROCEDURE — C9803 HOPD COVID-19 SPEC COLLECT: HCPCS | Performed by: SURGERY

## 2020-07-21 PROCEDURE — U0003 INFECTIOUS AGENT DETECTION BY NUCLEIC ACID (DNA OR RNA); SEVERE ACUTE RESPIRATORY SYNDROME CORONAVIRUS 2 (SARS-COV-2) (CORONAVIRUS DISEASE [COVID-19]), AMPLIFIED PROBE TECHNIQUE, MAKING USE OF HIGH THROUGHPUT TECHNOLOGIES AS DESCRIBED BY CMS-2020-01-R: HCPCS | Performed by: SURGERY

## 2020-07-22 LAB
COVID LABCORP PRIORITY: NORMAL
SARS-COV-2 RNA RESP QL NAA+PROBE: NOT DETECTED

## 2020-07-24 ENCOUNTER — HOSPITAL ENCOUNTER (OUTPATIENT)
Facility: HOSPITAL | Age: 55
Setting detail: HOSPITAL OUTPATIENT SURGERY
Discharge: HOME | End: 2020-07-24
Attending: SURGERY | Admitting: SURGERY

## 2020-07-24 ENCOUNTER — ANESTHESIA EVENT (OUTPATIENT)
Dept: GASTROENTEROLOGY | Facility: HOSPITAL | Age: 55
End: 2020-07-24

## 2020-07-24 ENCOUNTER — ANESTHESIA (OUTPATIENT)
Dept: GASTROENTEROLOGY | Facility: HOSPITAL | Age: 55
End: 2020-07-24

## 2020-07-24 VITALS
SYSTOLIC BLOOD PRESSURE: 113 MMHG | WEIGHT: 148 LBS | TEMPERATURE: 97.5 F | OXYGEN SATURATION: 100 % | RESPIRATION RATE: 18 BRPM | HEART RATE: 83 BPM | DIASTOLIC BLOOD PRESSURE: 69 MMHG | BODY MASS INDEX: 24.63 KG/M2

## 2020-07-24 DIAGNOSIS — Z12.11 SCREEN FOR COLON CANCER: ICD-10-CM

## 2020-07-24 PROCEDURE — 25010000002 MIDAZOLAM PER 1 MG: Performed by: NURSE ANESTHETIST, CERTIFIED REGISTERED

## 2020-07-24 PROCEDURE — 25010000002 PROPOFOL 10 MG/ML EMULSION: Performed by: NURSE ANESTHETIST, CERTIFIED REGISTERED

## 2020-07-24 PROCEDURE — 45378 DIAGNOSTIC COLONOSCOPY: CPT | Performed by: SURGERY

## 2020-07-24 RX ORDER — PROPOFOL 10 MG/ML
VIAL (ML) INTRAVENOUS AS NEEDED
Status: DISCONTINUED | OUTPATIENT
Start: 2020-07-24 | End: 2020-07-24 | Stop reason: SURG

## 2020-07-24 RX ORDER — MIDAZOLAM HYDROCHLORIDE 1 MG/ML
INJECTION INTRAMUSCULAR; INTRAVENOUS AS NEEDED
Status: DISCONTINUED | OUTPATIENT
Start: 2020-07-24 | End: 2020-07-24 | Stop reason: SURG

## 2020-07-24 RX ORDER — ONDANSETRON 2 MG/ML
4 INJECTION INTRAMUSCULAR; INTRAVENOUS ONCE AS NEEDED
Status: DISCONTINUED | OUTPATIENT
Start: 2020-07-24 | End: 2020-07-24 | Stop reason: HOSPADM

## 2020-07-24 RX ORDER — LIDOCAINE HYDROCHLORIDE 20 MG/ML
INJECTION, SOLUTION INTRAVENOUS AS NEEDED
Status: DISCONTINUED | OUTPATIENT
Start: 2020-07-24 | End: 2020-07-24 | Stop reason: SURG

## 2020-07-24 RX ORDER — DEXTROSE AND SODIUM CHLORIDE 5; .45 G/100ML; G/100ML
100 INJECTION, SOLUTION INTRAVENOUS CONTINUOUS PRN
Status: DISCONTINUED | OUTPATIENT
Start: 2020-07-24 | End: 2020-07-24 | Stop reason: HOSPADM

## 2020-07-24 RX ADMIN — MIDAZOLAM HYDROCHLORIDE 2 MG: 2 INJECTION, SOLUTION INTRAMUSCULAR; INTRAVENOUS at 08:23

## 2020-07-24 RX ADMIN — PROPOFOL 20 MG: 10 INJECTION, EMULSION INTRAVENOUS at 08:44

## 2020-07-24 RX ADMIN — PROPOFOL 10 MG: 10 INJECTION, EMULSION INTRAVENOUS at 08:30

## 2020-07-24 RX ADMIN — LIDOCAINE HYDROCHLORIDE 50 MG: 20 INJECTION, SOLUTION INTRAVENOUS at 08:28

## 2020-07-24 RX ADMIN — PROPOFOL 20 MG: 10 INJECTION, EMULSION INTRAVENOUS at 08:40

## 2020-07-24 RX ADMIN — PROPOFOL 10 MG: 10 INJECTION, EMULSION INTRAVENOUS at 08:32

## 2020-07-24 RX ADMIN — PROPOFOL 50 MG: 10 INJECTION, EMULSION INTRAVENOUS at 08:36

## 2020-07-24 RX ADMIN — PROPOFOL 30 MG: 10 INJECTION, EMULSION INTRAVENOUS at 08:39

## 2020-07-24 RX ADMIN — PROPOFOL 80 MG: 10 INJECTION, EMULSION INTRAVENOUS at 08:28

## 2020-07-24 RX ADMIN — DEXTROSE AND SODIUM CHLORIDE: 5; 450 INJECTION, SOLUTION INTRAVENOUS at 08:00

## 2020-07-24 RX ADMIN — DEXTROSE AND SODIUM CHLORIDE 100 ML/HR: 5; 450 INJECTION, SOLUTION INTRAVENOUS at 07:53

## 2020-07-24 RX ADMIN — PROPOFOL 20 MG: 10 INJECTION, EMULSION INTRAVENOUS at 08:37

## 2020-07-24 RX ADMIN — PROPOFOL 20 MG: 10 INJECTION, EMULSION INTRAVENOUS at 08:42

## 2020-07-24 NOTE — ANESTHESIA POSTPROCEDURE EVALUATION
Patient: Magdalene Meza    Procedure Summary     Date:  07/24/20 Room / Location:  [facility] ENDOSCOPY 2 / [facility] ENDOSCOPY    Anesthesia Start:  0824 Anesthesia Stop:  0845    Procedure:  COLONOSCOPY (N/A ) Diagnosis:       Screen for colon cancer      (Screen for colon cancer [Z12.11])    Surgeon:  Heath Caballero MD Provider:      Anesthesia Type:  MAC ASA Status:  2          Anesthesia Type: MAC    Vitals  No vitals data found for the desired time range.          Post Anesthesia Care and Evaluation    Patient location during evaluation: PACU  Patient participation: complete - patient participated  Level of consciousness: awake  Pain score: 0  Pain management: adequate  Airway patency: patent  Anesthetic complications: No anesthetic complications  PONV Status: none  Cardiovascular status: acceptable  Respiratory status: acceptable  Hydration status: acceptable

## 2020-07-24 NOTE — H&P
No chief complaint on file.      Magdalene Meza is a 55 y.o. female referred today for evaluation for colonoscopy.  She notes no change in bowel habits, no blood in the stool.     Prior Colonoscopy:yes  Prior Polyps:no  Family History of Colon Cancer:yes,  Father had colon cancer in his 60's  On anticoagulation:no    Past Surgical History:   Procedure Laterality Date   • CHOLECYSTECTOMY     • ENDOMETRIAL ABLATION     • LAPAROSCOPIC TUBAL LIGATION       Past Medical History:   Diagnosis Date   • Allergic rhinitis    • Female hypogonadism 3/17/2020   • GERD (gastroesophageal reflux disease)    • Low testosterone level in female 3/17/2020   • Migraine    • Sinusitis      Social History     Socioeconomic History   • Marital status:      Spouse name: Not on file   • Number of children: Not on file   • Years of education: Not on file   • Highest education level: Not on file   Tobacco Use   • Smoking status: Never Smoker   • Smokeless tobacco: Never Used   Substance and Sexual Activity   • Alcohol use: Yes     Frequency: Monthly or less   • Drug use: No   • Sexual activity: Defer     Family History   Problem Relation Age of Onset   • Cancer Father    • Heart disease Father    • Stroke Father      Allergies   Allergen Reactions   • Ceclor [Cefaclor] Hives   • Diclofenac GI Intolerance     Causes stomach problems.     • Duloxetine Unknown - Low Severity     muscle twitching   • Gabapentin Unknown - Low Severity     Causes patient to retain fluid   • Pregabalin Unknown - Low Severity     Orbital swelling   • Miconazole Rash       Home Medications:  Prior to Admission medications    Medication Sig Start Date End Date Taking? Authorizing Provider   buPROPion XL (WELLBUTRIN XL) 300 MG 24 hr tablet take 1 tablet by mouth once daily 9/8/15  Yes Provider, MD Polo   cyclobenzaprine (FLEXERIL) 10 MG tablet Take 10 mg by mouth. 1/29/19  Yes Provider, MD Polo   estradiol valerate (DELESTROGEN) 40 MG/ML  "injection Inject 0.5 mL into the appropriate muscle as directed by prescriber Every 28 (Twenty-Eight) Days. #1, 18 G / 22 g / 3 cc syringe 4/10/20  Yes Micky Yan MD   progesterone (PROMETRIUM) 100 MG capsule 100 mg po daily  Patient taking differently: 100 mg. 100 mg po daily 3/17/20  Yes Micky Yan MD   Syringe 22G X 1\" 3 ML misc Use  as indicated 5/11/20  Yes Micky Yan MD   valACYclovir (VALTREX) 1000 MG tablet TK 2 TS PO BID FOR 1 DAY 2/13/20  Yes Polo Caal MD   Bremelanotide Acetate (Vyleesi) 1.75 MG/0.3ML solution auto-injector 1.75 mg as needed, =45 minutes before sexual activity 7/17/20   Micky Yan MD   fluticasone (FLONASE) 50 MCG/ACT nasal spray 50 mcg. 4/3/14   ProviderPolo MD   Ibuprofen 200 MG capsule Take 200 mg by mouth.    ProviderPolo MD   Lisdexamfetamine Dimesylate (VYVANSE PO) Take 50 mg by mouth Daily.    ProviderPolo MD   Needle, Disp, 18G X 1\" misc Use   as indicated 5/11/20   Micky Yan MD   promethazine (PHENERGAN) 25 MG tablet take 1/2 to 1 tablet by mouth every 6 hours if needed 10/8/14   ProviderPolo MD       Review of Systems   Constitutional: Negative.    HENT: Negative for hearing loss, nosebleeds and trouble swallowing.    Respiratory: Negative for apnea, chest tightness and shortness of breath.    Cardiovascular: Negative for chest pain and palpitations.   Gastrointestinal: Negative for abdominal distention, abdominal pain, blood in stool, constipation, diarrhea, nausea and vomiting.   Genitourinary: Negative for difficulty urinating, dysuria, frequency and urgency.   Musculoskeletal: Negative for back pain, joint swelling and neck pain.   Skin: Negative for rash.   Neurological: Negative for dizziness, seizures, weakness, light-headedness, numbness and headaches.   Hematological: Negative for adenopathy.   Psychiatric/Behavioral: Negative for agitation. The " patient is not nervous/anxious.        Vitals:    07/24/20 0738   BP: 147/96   Pulse: 89   Resp: 18   Temp: 97.4 °F (36.3 °C)   SpO2: 100%       Physical Exam   Constitutional: She is oriented to person, place, and time. She appears well-developed and well-nourished. No distress.   HENT:   Head: Normocephalic and atraumatic.   Eyes: Conjunctivae and EOM are normal. No scleral icterus.   Neck: Normal range of motion. Neck supple. No tracheal deviation present. No thyromegaly present.   Cardiovascular: Normal rate, regular rhythm and normal heart sounds. Exam reveals no gallop and no friction rub.   No murmur heard.  Pulmonary/Chest: Effort normal and breath sounds normal. No stridor. No respiratory distress. She has no wheezes. She has no rales. She exhibits no tenderness.   Abdominal: Soft. Bowel sounds are normal. She exhibits no distension and no mass. There is no tenderness. There is no rebound and no guarding. No hernia.   Musculoskeletal: Normal range of motion. She exhibits no edema or deformity.   Lymphadenopathy:     She has no cervical adenopathy.   Neurological: She is alert and oriented to person, place, and time.   Skin: Skin is warm and dry. No rash noted. No cyanosis or erythema. No pallor. Nails show no clubbing.   Psychiatric: She has a normal mood and affect. Her behavior is normal. Thought content normal.       Assessment     In need of screening colonoscopy.    Plan     Risks, benefits, rationale and prep for colonoscopy have been discussed with the patient.  The patient indicates understanding of these issues and agrees with the plan.

## 2020-07-24 NOTE — ANESTHESIA PREPROCEDURE EVALUATION
Anesthesia Evaluation     Patient summary reviewed and Nursing notes reviewed   NPO Solid Status: > 8 hours  NPO Liquid Status: > 4 hours           Airway   Mallampati: II  TM distance: >3 FB  Neck ROM: full  Dental - normal exam     Pulmonary - normal exam   (+) asthma,  Cardiovascular - normal exam        Neuro/Psych  (+) headaches, psychiatric history Anxiety and Depression,     GI/Hepatic/Renal/Endo    (+)  GERD,      Musculoskeletal     Abdominal  - normal exam   Substance History      OB/GYN          Other                        Anesthesia Plan    ASA 2     MAC     intravenous induction     Anesthetic plan, all risks, benefits, and alternatives have been provided, discussed and informed consent has been obtained with: patient.

## 2020-07-29 ENCOUNTER — LAB (OUTPATIENT)
Dept: LAB | Facility: HOSPITAL | Age: 55
End: 2020-07-29

## 2020-07-29 LAB
ALBUMIN SERPL-MCNC: 4.6 G/DL (ref 3.5–5.2)
ALBUMIN/GLOB SERPL: 1.6 G/DL
ALP SERPL-CCNC: 71 U/L (ref 39–117)
ALT SERPL W P-5'-P-CCNC: 16 U/L (ref 1–33)
ANION GAP SERPL CALCULATED.3IONS-SCNC: 8.5 MMOL/L (ref 5–15)
AST SERPL-CCNC: 17 U/L (ref 1–32)
BASOPHILS # BLD AUTO: 0.08 10*3/MM3 (ref 0–0.2)
BASOPHILS NFR BLD AUTO: 1 % (ref 0–1.5)
BILIRUB SERPL-MCNC: 0.3 MG/DL (ref 0–1.2)
BUN SERPL-MCNC: 13 MG/DL (ref 6–20)
BUN/CREAT SERPL: 16.7 (ref 7–25)
CALCIUM SPEC-SCNC: 9.7 MG/DL (ref 8.6–10.5)
CHLORIDE SERPL-SCNC: 100 MMOL/L (ref 98–107)
CHROMATIN AB SERPL-ACNC: 10.4 IU/ML (ref 0–14)
CO2 SERPL-SCNC: 26.5 MMOL/L (ref 22–29)
CREAT SERPL-MCNC: 0.78 MG/DL (ref 0.57–1)
CRP SERPL-MCNC: 0.18 MG/DL (ref 0–0.5)
DEPRECATED RDW RBC AUTO: 38.7 FL (ref 37–54)
EOSINOPHIL # BLD AUTO: 0.24 10*3/MM3 (ref 0–0.4)
EOSINOPHIL NFR BLD AUTO: 3 % (ref 0.3–6.2)
ERYTHROCYTE [DISTWIDTH] IN BLOOD BY AUTOMATED COUNT: 12.2 % (ref 12.3–15.4)
ERYTHROCYTE [SEDIMENTATION RATE] IN BLOOD: 5 MM/HR (ref 0–30)
ESTRADIOL SERPL HS-MCNC: 353 PG/ML
GFR SERPL CREATININE-BSD FRML MDRD: 76 ML/MIN/1.73
GLOBULIN UR ELPH-MCNC: 2.8 GM/DL
GLUCOSE SERPL-MCNC: 107 MG/DL (ref 65–99)
HCT VFR BLD AUTO: 41.2 % (ref 34–46.6)
HGB BLD-MCNC: 14.4 G/DL (ref 12–15.9)
IMM GRANULOCYTES # BLD AUTO: 0.03 10*3/MM3 (ref 0–0.05)
IMM GRANULOCYTES NFR BLD AUTO: 0.4 % (ref 0–0.5)
LYMPHOCYTES # BLD AUTO: 2.11 10*3/MM3 (ref 0.7–3.1)
LYMPHOCYTES NFR BLD AUTO: 26.5 % (ref 19.6–45.3)
MCH RBC QN AUTO: 30.4 PG (ref 26.6–33)
MCHC RBC AUTO-ENTMCNC: 35 G/DL (ref 31.5–35.7)
MCV RBC AUTO: 87.1 FL (ref 79–97)
MONOCYTES # BLD AUTO: 0.61 10*3/MM3 (ref 0.1–0.9)
MONOCYTES NFR BLD AUTO: 7.7 % (ref 5–12)
NEUTROPHILS NFR BLD AUTO: 4.9 10*3/MM3 (ref 1.7–7)
NEUTROPHILS NFR BLD AUTO: 61.4 % (ref 42.7–76)
NRBC BLD AUTO-RTO: 0 /100 WBC (ref 0–0.2)
PLATELET # BLD AUTO: 352 10*3/MM3 (ref 140–450)
PMV BLD AUTO: 10.7 FL (ref 6–12)
POTASSIUM SERPL-SCNC: 4.3 MMOL/L (ref 3.5–5.2)
PROGEST SERPL-MCNC: 50.4 NG/ML
PROT SERPL-MCNC: 7.4 G/DL (ref 6–8.5)
RBC # BLD AUTO: 4.73 10*6/MM3 (ref 3.77–5.28)
SODIUM SERPL-SCNC: 135 MMOL/L (ref 136–145)
URATE SERPL-MCNC: 4.5 MG/DL (ref 2.4–5.7)
WBC # BLD AUTO: 7.97 10*3/MM3 (ref 3.4–10.8)

## 2020-07-29 PROCEDURE — 85025 COMPLETE CBC W/AUTO DIFF WBC: CPT | Performed by: INTERNAL MEDICINE

## 2020-07-29 PROCEDURE — 85652 RBC SED RATE AUTOMATED: CPT | Performed by: INTERNAL MEDICINE

## 2020-07-29 PROCEDURE — 86431 RHEUMATOID FACTOR QUANT: CPT | Performed by: INTERNAL MEDICINE

## 2020-07-29 PROCEDURE — 86140 C-REACTIVE PROTEIN: CPT | Performed by: INTERNAL MEDICINE

## 2020-07-29 PROCEDURE — 80053 COMPREHEN METABOLIC PANEL: CPT | Performed by: INTERNAL MEDICINE

## 2020-07-29 PROCEDURE — 84144 ASSAY OF PROGESTERONE: CPT | Performed by: INTERNAL MEDICINE

## 2020-07-29 PROCEDURE — 84550 ASSAY OF BLOOD/URIC ACID: CPT | Performed by: INTERNAL MEDICINE

## 2020-07-29 PROCEDURE — 84403 ASSAY OF TOTAL TESTOSTERONE: CPT | Performed by: INTERNAL MEDICINE

## 2020-07-29 PROCEDURE — 36415 COLL VENOUS BLD VENIPUNCTURE: CPT | Performed by: INTERNAL MEDICINE

## 2020-07-29 PROCEDURE — 82670 ASSAY OF TOTAL ESTRADIOL: CPT | Performed by: INTERNAL MEDICINE

## 2020-07-29 PROCEDURE — 86038 ANTINUCLEAR ANTIBODIES: CPT | Performed by: INTERNAL MEDICINE

## 2020-07-30 LAB — ANA SER QL: NEGATIVE

## 2020-07-31 LAB — TESTOST SERPL-MCNC: 23.4 NG/DL

## 2020-08-03 ENCOUNTER — OFFICE VISIT (OUTPATIENT)
Dept: PODIATRY | Facility: CLINIC | Age: 55
End: 2020-08-03

## 2020-08-03 VITALS — WEIGHT: 148 LBS | BODY MASS INDEX: 24.66 KG/M2 | OXYGEN SATURATION: 99 % | HEIGHT: 65 IN | HEART RATE: 97 BPM

## 2020-08-03 DIAGNOSIS — M20.11 HALLUX VALGUS OF RIGHT FOOT: ICD-10-CM

## 2020-08-03 DIAGNOSIS — M79.671 RIGHT FOOT PAIN: Primary | ICD-10-CM

## 2020-08-03 DIAGNOSIS — M20.41 HAMMER TOE OF RIGHT FOOT: ICD-10-CM

## 2020-08-03 DIAGNOSIS — M77.41 METATARSALGIA OF RIGHT FOOT: ICD-10-CM

## 2020-08-03 DIAGNOSIS — M77.51 CAPSULITIS OF METATARSOPHALANGEAL (MTP) JOINT OF RIGHT FOOT: ICD-10-CM

## 2020-08-03 PROCEDURE — 99203 OFFICE O/P NEW LOW 30 MIN: CPT | Performed by: PODIATRIST

## 2020-08-03 PROCEDURE — 20600 DRAIN/INJ JOINT/BURSA W/O US: CPT | Performed by: PODIATRIST

## 2020-08-03 RX ORDER — CYCLOBENZAPRINE HCL 10 MG
10 TABLET ORAL
COMMUNITY
Start: 2020-03-19 | End: 2021-05-02

## 2020-08-03 RX ORDER — LISDEXAMFETAMINE DIMESYLATE 60 MG/1
CAPSULE ORAL
COMMUNITY
Start: 2020-07-15

## 2020-08-03 RX ORDER — BETAMETHASONE SODIUM PHOSPHATE AND BETAMETHASONE ACETATE 3; 3 MG/ML; MG/ML
6 INJECTION, SUSPENSION INTRA-ARTICULAR; INTRALESIONAL; INTRAMUSCULAR; SOFT TISSUE ONCE
Status: COMPLETED | OUTPATIENT
Start: 2020-08-03 | End: 2020-08-03

## 2020-08-03 RX ORDER — BUTALBITAL, ACETAMINOPHEN AND CAFFEINE 50; 325; 40 MG/1; MG/1; MG/1
1 TABLET ORAL EVERY 4 HOURS PRN
COMMUNITY
Start: 2020-07-31 | End: 2020-08-31

## 2020-08-03 RX ORDER — BUPIVACAINE HYDROCHLORIDE 5 MG/ML
5 INJECTION, SOLUTION PERINEURAL ONCE
Status: COMPLETED | OUTPATIENT
Start: 2020-08-03 | End: 2020-08-03

## 2020-08-03 RX ORDER — TAPENTADOL HYDROCHLORIDE 50 MG/1
TABLET, FILM COATED ORAL
COMMUNITY
Start: 2020-06-26

## 2020-08-03 RX ADMIN — BUPIVACAINE HYDROCHLORIDE 1 ML: 5 INJECTION, SOLUTION PERINEURAL at 16:11

## 2020-08-03 RX ADMIN — BETAMETHASONE SODIUM PHOSPHATE AND BETAMETHASONE ACETATE 6 MG: 3; 3 INJECTION, SUSPENSION INTRA-ARTICULAR; INTRALESIONAL; INTRAMUSCULAR; SOFT TISSUE at 16:12

## 2020-08-03 NOTE — PROGRESS NOTES
Magdalene Meza  1965  55 y.o. female     Patient came to clinic for pain in right foot states her pain is 3/10    08/03/2020  Chief Complaint   Patient presents with   • Right Foot - Pain           History of Present Illness    Magdalene Meza is a 55 y.o. female presents for evaluation of right forefoot pain.  States the pain is mainly located in the ball of her foot near the base of her second toe.  This is been ongoing and slowly worsening over the past few months.  She denies any trauma or injuries.  She does note a chronic history of bunion deformity which has worsened somewhat.  She has had custom orthotics in the past however she states that these have become less comfortable.  She is recently been buying shoes with softer foam insoles.  She describes her pain is achy, at times throbbing and worse with weightbearing, somewhat relieved with rest.      Past Medical History:   Diagnosis Date   • Allergic rhinitis    • Female hypogonadism 3/17/2020   • GERD (gastroesophageal reflux disease)    • Low testosterone level in female 3/17/2020   • Migraine    • Sinusitis          Past Surgical History:   Procedure Laterality Date   • CHOLECYSTECTOMY     • COLONOSCOPY N/A 7/24/2020    Procedure: COLONOSCOPY;  Surgeon: Heath Caballero MD;  Location: [facility] ENDOSCOPY;  Service: General;  Laterality: N/A;   • ENDOMETRIAL ABLATION     • LAPAROSCOPIC TUBAL LIGATION           Family History   Problem Relation Age of Onset   • Cancer Father    • Heart disease Father    • Stroke Father    • Severe sprains Father    • Rashes / Skin problems Father    • Cancer Maternal Grandmother    • Severe sprains Maternal Grandfather    • Heart disease Paternal Grandmother    • Cancer Paternal Grandfather    • Heart disease Paternal Grandfather          Social History     Socioeconomic History   • Marital status:      Spouse name: Not on file   • Number of children: Not on file   • Years of education: Not on  "file   • Highest education level: Not on file   Tobacco Use   • Smoking status: Never Smoker   • Smokeless tobacco: Never Used   Substance and Sexual Activity   • Alcohol use: Yes     Frequency: Monthly or less   • Drug use: No   • Sexual activity: Defer         Current Outpatient Medications   Medication Sig Dispense Refill   • Bremelanotide Acetate (Vyleesi) 1.75 MG/0.3ML solution auto-injector 1.75 mg as needed, =45 minutes before sexual activity 1 pen 11   • buPROPion XL (WELLBUTRIN XL) 300 MG 24 hr tablet take 1 tablet by mouth once daily     • butalbital-acetaminophen-caffeine (FIORICET, ESGIC) -40 MG per tablet Take 1 tablet by mouth Every 4 (Four) Hours As Needed.     • cyclobenzaprine (FLEXERIL) 10 MG tablet Take 10 mg by mouth.     • diclofenac (VOLTAREN) 1 % gel gel      • estradiol valerate (DELESTROGEN) 40 MG/ML injection Inject 0.5 mL into the appropriate muscle as directed by prescriber Every 28 (Twenty-Eight) Days. #1, 18 G / 22 g / 3 cc syringe 1 each 11   • fluticasone (FLONASE) 50 MCG/ACT nasal spray 50 mcg.     • Ibuprofen 200 MG capsule Take 200 mg by mouth.     • Needle, Disp, 18G X 1\" misc Use   as indicated 4 each 11   • NUCYNTA 50 MG tablet      • progesterone (PROMETRIUM) 200 MG capsule TK 1-2 CS PO QHS     • promethazine (PHENERGAN) 25 MG tablet take 1/2 to 1 tablet by mouth every 6 hours if needed     • Syringe 22G X 1\" 3 ML misc Use  as indicated 4 each 11   • valACYclovir (VALTREX) 1000 MG tablet TK 2 TS PO BID FOR 1 DAY     • VYVANSE 60 MG capsule        No current facility-administered medications for this visit.          OBJECTIVE    Pulse 97   Ht 165.1 cm (65\")   Wt 67.1 kg (148 lb)   SpO2 99%   BMI 24.63 kg/m²       Review of Systems   Constitutional: Negative.    HENT: Positive for hearing loss.    Eyes: Negative.    Respiratory: Negative.    Cardiovascular: Negative.    Gastrointestinal: Positive for nausea.   Endocrine: Negative.    Genitourinary: Negative.  "   Musculoskeletal: Positive for gait problem.        Joint pain. foot pain, and ankle pain    Skin: Negative.    Allergic/Immunologic: Negative.    Neurological: Positive for numbness and headaches.   Hematological: Negative.    Psychiatric/Behavioral: Negative.          Physical Exam   Constitutional: she appears well-developed and well-nourished.   HEENT: Normocephalic. Atraumatic  CV: No CP. RRR  Resp: Non-labored respirations  Psychiatric: she has a normal mood and affect. her behavior is normal.         Lower Extremity Exam:  Vascular: DP/PT pulses palpable 2+.   No edema  Foot warm  CFT wnl  Neuro: Protective sensation intact, b/l.  DTRs intact  Integument: No open wounds or lesions.  No erythema, scaling  Skin quality normal  Musculoskeletal: LE muscle strength 5/5.   Gait normal  Ankle ROM decreased without pain or crepitus  STJ ROM full without pain or crepitus  1st MTPJ ROM full without tenderness. Moderate HAV  +Dorsomedial 1st mtpj eminence. Mild tenderness on palpation.  Mild hammertoe deformity 2 through 5 with pre-dislocation syndrome of right second digit.  Positive tenderness palpation of plantar second MTPJ.      Small joint injection:  Risks and benefits discussed. Written consent obtained.  Skin prepped with alcohol  2nd MTPJ injection performed with 0.5cc 0.5% Marcaine, 6mg celestone with 27g needle  Band aid applied. Patient tolerated well.          ASSESSMENT AND PLAN    Magdalene was seen today for pain.    Diagnoses and all orders for this visit:    Right foot pain  -     XR Foot 3+ View Right  -     bupivacaine (MARCAINE) 0.5 % injection 1 mL  -     betamethasone acetate-betamethasone sodium phosphate (CELESTONE SOLUSPAN) injection 6 mg    Hallux valgus of right foot    Hammer toe of right foot    Metatarsalgia of right foot    Capsulitis of metatarsophalangeal (MTP) joint of right foot        -Radiographs ordered and reviewed  -Discussed findings of transfer metatarsalgia due to chronic bunion  deformity and worsening second hammertoe.  -Corticosteroid injection as above for acute capsulitis  -Advised stable athletic style shoe gear.  Will refer to physical therapy for new custom orthotic fabrication with metatarsal pads  -Recheck 8 weeks, as needed        This document has been electronically signed by Herman Issa DPM on August 3, 2020 16:22     EMR Dragon/Transcription disclaimer:   Much of this encounter note is an electronic transcription/translation of spoken language to printed text. The electronic translation of spoken language may permit erroneous, or at times, nonsensical words or phrases to be inadvertently transcribed; Although I have reviewed the note for such errors, some may still exist.    Herman Issa DPM  8/3/2020  16:22

## 2020-08-04 ENCOUNTER — TREATMENT (OUTPATIENT)
Dept: ENDOCRINOLOGY | Facility: CLINIC | Age: 55
End: 2020-08-04

## 2020-08-04 ENCOUNTER — TRANSCRIBE ORDERS (OUTPATIENT)
Dept: PODIATRY | Facility: CLINIC | Age: 55
End: 2020-08-04

## 2020-08-04 DIAGNOSIS — E28.39 FEMALE HYPOGONADISM: ICD-10-CM

## 2020-08-04 DIAGNOSIS — M20.10 ACQUIRED HALLUX VALGUS, UNSPECIFIED LATERALITY: ICD-10-CM

## 2020-08-04 DIAGNOSIS — R79.89 LOW TESTOSTERONE LEVEL IN FEMALE: Primary | ICD-10-CM

## 2020-08-04 DIAGNOSIS — M77.40 METATARSALGIA, UNSPECIFIED LATERALITY: Primary | ICD-10-CM

## 2020-08-04 PROCEDURE — 99442 PR PHYS/QHP TELEPHONE EVALUATION 11-20 MIN: CPT | Performed by: INTERNAL MEDICINE

## 2020-08-04 NOTE — PROGRESS NOTES
ICD-10-CM ICD-9-CM   1. Low testosterone level in female R79.89 790.99   2. Female hypogonadism E28.39 256.39     EVISIT      Patient contacted office     Estradiol 300s , testosterone 20, progesterone 50    3 weeks post valerate 20 mg    2 hours post 1 mg of testosterone    On 200 mg progesterone daily , should be taking only 10 days but feels better monthly so we can keept       Total time 12 minutes           This document has been electronically signed by Micky Donald MD on August 4, 2020 14:34

## 2020-11-20 ENCOUNTER — TELEPHONE (OUTPATIENT)
Dept: ENDOCRINOLOGY | Facility: CLINIC | Age: 55
End: 2020-11-20

## 2020-11-21 RX ORDER — ESTRADIOL VALERATE 40 MG/ML
20 INJECTION INTRAMUSCULAR
Qty: 1 EACH | Refills: 11 | Status: SHIPPED | OUTPATIENT
Start: 2020-11-21 | End: 2021-06-01

## 2020-12-01 ENCOUNTER — LAB (OUTPATIENT)
Dept: LAB | Facility: HOSPITAL | Age: 55
End: 2020-12-01

## 2020-12-01 LAB
ALBUMIN SERPL-MCNC: 4.8 G/DL (ref 3.5–5.2)
ALBUMIN/GLOB SERPL: 1.7 G/DL
ALP SERPL-CCNC: 112 U/L (ref 39–117)
ALT SERPL W P-5'-P-CCNC: 21 U/L (ref 1–33)
ANION GAP SERPL CALCULATED.3IONS-SCNC: 9 MMOL/L (ref 5–15)
AST SERPL-CCNC: 21 U/L (ref 1–32)
BASOPHILS # BLD AUTO: 0.05 10*3/MM3 (ref 0–0.2)
BASOPHILS NFR BLD AUTO: 0.6 % (ref 0–1.5)
BILIRUB SERPL-MCNC: 0.3 MG/DL (ref 0–1.2)
BUN SERPL-MCNC: 8 MG/DL (ref 6–20)
BUN/CREAT SERPL: 9.4 (ref 7–25)
CALCIUM SPEC-SCNC: 9.3 MG/DL (ref 8.6–10.5)
CHLORIDE SERPL-SCNC: 100 MMOL/L (ref 98–107)
CO2 SERPL-SCNC: 27 MMOL/L (ref 22–29)
CREAT SERPL-MCNC: 0.85 MG/DL (ref 0.57–1)
DEPRECATED RDW RBC AUTO: 38.3 FL (ref 37–54)
EOSINOPHIL # BLD AUTO: 0.34 10*3/MM3 (ref 0–0.4)
EOSINOPHIL NFR BLD AUTO: 4.2 % (ref 0.3–6.2)
ERYTHROCYTE [DISTWIDTH] IN BLOOD BY AUTOMATED COUNT: 12.4 % (ref 12.3–15.4)
GFR SERPL CREATININE-BSD FRML MDRD: 69 ML/MIN/1.73
GLOBULIN UR ELPH-MCNC: 2.8 GM/DL
GLUCOSE SERPL-MCNC: 98 MG/DL (ref 65–99)
HCT VFR BLD AUTO: 42.1 % (ref 34–46.6)
HGB BLD-MCNC: 14.7 G/DL (ref 12–15.9)
IMM GRANULOCYTES # BLD AUTO: 0.03 10*3/MM3 (ref 0–0.05)
IMM GRANULOCYTES NFR BLD AUTO: 0.4 % (ref 0–0.5)
LYMPHOCYTES # BLD AUTO: 1.77 10*3/MM3 (ref 0.7–3.1)
LYMPHOCYTES NFR BLD AUTO: 21.9 % (ref 19.6–45.3)
MCH RBC QN AUTO: 29.9 PG (ref 26.6–33)
MCHC RBC AUTO-ENTMCNC: 34.9 G/DL (ref 31.5–35.7)
MCV RBC AUTO: 85.7 FL (ref 79–97)
MONOCYTES # BLD AUTO: 0.53 10*3/MM3 (ref 0.1–0.9)
MONOCYTES NFR BLD AUTO: 6.5 % (ref 5–12)
NEUTROPHILS NFR BLD AUTO: 5.38 10*3/MM3 (ref 1.7–7)
NEUTROPHILS NFR BLD AUTO: 66.4 % (ref 42.7–76)
NRBC BLD AUTO-RTO: 0 /100 WBC (ref 0–0.2)
PLATELET # BLD AUTO: 304 10*3/MM3 (ref 140–450)
PMV BLD AUTO: 10.2 FL (ref 6–12)
POTASSIUM SERPL-SCNC: 3.8 MMOL/L (ref 3.5–5.2)
PROGEST SERPL-MCNC: 7.57 NG/ML
PROT SERPL-MCNC: 7.6 G/DL (ref 6–8.5)
RBC # BLD AUTO: 4.91 10*6/MM3 (ref 3.77–5.28)
SODIUM SERPL-SCNC: 136 MMOL/L (ref 136–145)
WBC # BLD AUTO: 8.1 10*3/MM3 (ref 3.4–10.8)

## 2020-12-01 PROCEDURE — 84144 ASSAY OF PROGESTERONE: CPT | Performed by: INTERNAL MEDICINE

## 2020-12-01 PROCEDURE — 36415 COLL VENOUS BLD VENIPUNCTURE: CPT | Performed by: INTERNAL MEDICINE

## 2020-12-01 PROCEDURE — 80053 COMPREHEN METABOLIC PANEL: CPT | Performed by: INTERNAL MEDICINE

## 2020-12-01 PROCEDURE — 85025 COMPLETE CBC W/AUTO DIFF WBC: CPT | Performed by: INTERNAL MEDICINE

## 2020-12-01 PROCEDURE — 84403 ASSAY OF TOTAL TESTOSTERONE: CPT | Performed by: INTERNAL MEDICINE

## 2020-12-01 PROCEDURE — 82670 ASSAY OF TOTAL ESTRADIOL: CPT | Performed by: INTERNAL MEDICINE

## 2020-12-01 PROCEDURE — 82679 ASSAY OF ESTRONE: CPT | Performed by: INTERNAL MEDICINE

## 2020-12-04 LAB — TESTOST SERPL-MCNC: 11.8 NG/DL

## 2020-12-08 LAB
ESTRADIOL SERPL-MCNC: 335 PG/ML
ESTRONE SERPL-MCNC: 233 PG/ML

## 2020-12-11 ENCOUNTER — OFFICE VISIT (OUTPATIENT)
Dept: ENDOCRINOLOGY | Facility: CLINIC | Age: 55
End: 2020-12-11

## 2020-12-11 VITALS
DIASTOLIC BLOOD PRESSURE: 90 MMHG | HEIGHT: 65 IN | WEIGHT: 159 LBS | SYSTOLIC BLOOD PRESSURE: 130 MMHG | BODY MASS INDEX: 26.49 KG/M2 | HEART RATE: 95 BPM | OXYGEN SATURATION: 100 %

## 2020-12-11 DIAGNOSIS — R94.6 ABNORMAL THYROID FUNCTION TEST: ICD-10-CM

## 2020-12-11 DIAGNOSIS — E28.39 FEMALE HYPOGONADISM: ICD-10-CM

## 2020-12-11 DIAGNOSIS — R79.89 LOW TESTOSTERONE LEVEL IN FEMALE: Primary | ICD-10-CM

## 2020-12-11 PROCEDURE — 99214 OFFICE O/P EST MOD 30 MIN: CPT | Performed by: INTERNAL MEDICINE

## 2020-12-11 NOTE — PROGRESS NOTES
Magdalene Meza is a 55 y.o. female who presents for  evaluation of   Chief Complaint   Patient presents with   • Follow-up     3 mo mariola        Referring provider     Primary Care Provider    Maryam Devlin APRN    54 yo female     Low Female Testosterone    Duration, Since age 49 years old at the onset of menopause    Symptoms, Fatigue / Weight Gain - lost but regained 10 lbs     Modifying Factors, on Testosterone Pellets with improvement of symptoms but concerned for safety. swtiched to gel , not using     using estradiol valerate 20 mg every 3 to 4 weeks               Past Medical History:   Diagnosis Date   • Allergic rhinitis    • Female hypogonadism 3/17/2020   • GERD (gastroesophageal reflux disease)    • Low testosterone level in female 3/17/2020   • Migraine    • Sinusitis      Family History   Problem Relation Age of Onset   • Cancer Father    • Heart disease Father    • Stroke Father    • Severe sprains Father    • Rashes / Skin problems Father    • Cancer Maternal Grandmother    • Severe sprains Maternal Grandfather    • Heart disease Paternal Grandmother    • Cancer Paternal Grandfather    • Heart disease Paternal Grandfather      Social History     Tobacco Use   • Smoking status: Never Smoker   • Smokeless tobacco: Never Used   Substance Use Topics   • Alcohol use: Yes     Frequency: Monthly or less   • Drug use: No         Current Outpatient Medications:   •  buPROPion XL (WELLBUTRIN XL) 300 MG 24 hr tablet, take 1 tablet by mouth once daily, Disp: , Rfl:   •  cyclobenzaprine (FLEXERIL) 10 MG tablet, Take 10 mg by mouth., Disp: , Rfl:   •  diclofenac (VOLTAREN) 1 % gel gel, , Disp: , Rfl:   •  EC-RX Testosterone 10 % cream, Place 4 application on the skin as directed by provider Daily. Provide bluegrass compound testosterone 1%, 4 clicks daily , same as before, 1 month supply, Disp: 30 g, Rfl: 5  •  estradiol valerate (DELESTROGEN) 40 MG/ML injection, Inject 0.5 mL into the appropriate  "muscle as directed by prescriber Every 28 (Twenty-Eight) Days. #1, 18 G / 22 g / 3 cc syringe, Disp: 1 each, Rfl: 11  •  fluticasone (FLONASE) 50 MCG/ACT nasal spray, 50 mcg., Disp: , Rfl:   •  Needle, Disp, 18G X 1\" misc, Use   as indicated, Disp: 4 each, Rfl: 11  •  NUCYNTA 50 MG tablet, , Disp: , Rfl:   •  progesterone (PROMETRIUM) 200 MG capsule, TK 1-2 CS PO QHS, Disp: , Rfl:   •  promethazine (PHENERGAN) 25 MG tablet, take 1/2 to 1 tablet by mouth every 6 hours if needed, Disp: , Rfl:   •  Syringe 22G X 1\" 3 ML misc, Use  as indicated, Disp: 4 each, Rfl: 11  •  valACYclovir (VALTREX) 1000 MG tablet, TK 2 TS PO BID FOR 1 DAY, Disp: , Rfl:   •  VYVANSE 60 MG capsule, , Disp: , Rfl:     Review of Systems    Review of Systems   Constitutional: Positive for diaphoresis, fatigue and unexpected weight change. Negative for activity change, appetite change, chills and fever.   HENT: Negative for congestion, dental problem, drooling, ear discharge, ear pain, facial swelling, mouth sores, postnasal drip, rhinorrhea, sinus pressure, sore throat, tinnitus, trouble swallowing and voice change.    Eyes: Negative for photophobia, pain, discharge, redness, itching and visual disturbance.   Respiratory: Negative for apnea, cough, choking, chest tightness, shortness of breath, wheezing and stridor.    Cardiovascular: Negative for chest pain, palpitations and leg swelling.   Gastrointestinal: Negative for abdominal distention, abdominal pain, constipation, diarrhea, nausea and vomiting.   Endocrine: Negative for cold intolerance, heat intolerance, polydipsia, polyphagia and polyuria.        Low Libido    Genitourinary: Negative for decreased urine volume, difficulty urinating, dysuria, flank pain, frequency, hematuria and urgency.   Musculoskeletal: Negative for arthralgias, back pain, gait problem, joint swelling, myalgias, neck pain and neck stiffness.   Skin: Negative for color change, pallor, rash and wound. " "  Allergic/Immunologic: Negative for immunocompromised state.   Neurological: Positive for numbness. Negative for dizziness, tremors, seizures, syncope, facial asymmetry, speech difficulty, weakness, light-headedness and headaches.   Hematological: Negative for adenopathy.   Psychiatric/Behavioral: Negative for agitation, behavioral problems, confusion, decreased concentration, dysphoric mood, hallucinations, self-injury, sleep disturbance and suicidal ideas. The patient is not nervous/anxious and is not hyperactive.         Objective:   /90 (BP Location: Right arm, Patient Position: Sitting, Cuff Size: Large Adult)   Pulse 95   Ht 165.1 cm (65\")   Wt 72.1 kg (159 lb)   SpO2 100%   BMI 26.46 kg/m²     Physical Exam   Constitutional: She is oriented to person, place, and time. She appears well-developed.   HENT:   Head: Normocephalic.   Right Ear: External ear normal.   Left Ear: External ear normal.   Nose: Nose normal.   Eyes: Conjunctivae are normal. No scleral icterus.   Neck: Normal range of motion. Neck supple. No tracheal deviation present. No thyromegaly present.   Cardiovascular: Normal rate, regular rhythm and normal heart sounds. Exam reveals no gallop and no friction rub.   No murmur heard.  Pulmonary/Chest: Effort normal and breath sounds normal. No stridor. No respiratory distress. She has no wheezes. She has no rales. She exhibits no tenderness.   Abdominal: Soft. Bowel sounds are normal. She exhibits no distension and no mass. There is no abdominal tenderness. There is no rebound and no guarding.   Musculoskeletal: Normal range of motion. No tenderness or deformity.    Diabetic foot exam performed: toe deviation, painful   Lymphadenopathy:     She has no cervical adenopathy.   Neurological: She is alert and oriented to person, place, and time. She displays normal reflexes. She exhibits normal muscle tone. Coordination normal.   Skin: No rash noted. No erythema. No pallor.   Psychiatric: Her " behavior is normal. Judgment and thought content normal.       Lab Review    Results for orders placed or performed in visit on 08/04/20   Progesterone    Specimen: Blood   Result Value Ref Range    Progesterone 7.57 ng/mL   Testosterone, Total, Women & Children    Specimen: Blood   Result Value Ref Range    Testosterone, Total 11.8 ng/dL   Estrogens, Fractionated    Specimen: Blood   Result Value Ref Range    Estrone 233 pg/mL    Estradiol 335.0 pg/mL   Comprehensive Metabolic Panel    Specimen: Blood   Result Value Ref Range    Glucose 98 65 - 99 mg/dL    BUN 8 6 - 20 mg/dL    Creatinine 0.85 0.57 - 1.00 mg/dL    Sodium 136 136 - 145 mmol/L    Potassium 3.8 3.5 - 5.2 mmol/L    Chloride 100 98 - 107 mmol/L    CO2 27.0 22.0 - 29.0 mmol/L    Calcium 9.3 8.6 - 10.5 mg/dL    Total Protein 7.6 6.0 - 8.5 g/dL    Albumin 4.80 3.50 - 5.20 g/dL    ALT (SGPT) 21 1 - 33 U/L    AST (SGOT) 21 1 - 32 U/L    Alkaline Phosphatase 112 39 - 117 U/L    Total Bilirubin 0.3 0.0 - 1.2 mg/dL    eGFR Non African Amer 69 >60 mL/min/1.73    Globulin 2.8 gm/dL    A/G Ratio 1.7 g/dL    BUN/Creatinine Ratio 9.4 7.0 - 25.0    Anion Gap 9.0 5.0 - 15.0 mmol/L   CBC Auto Differential    Specimen: Blood   Result Value Ref Range    WBC 8.10 3.40 - 10.80 10*3/mm3    RBC 4.91 3.77 - 5.28 10*6/mm3    Hemoglobin 14.7 12.0 - 15.9 g/dL    Hematocrit 42.1 34.0 - 46.6 %    MCV 85.7 79.0 - 97.0 fL    MCH 29.9 26.6 - 33.0 pg    MCHC 34.9 31.5 - 35.7 g/dL    RDW 12.4 12.3 - 15.4 %    RDW-SD 38.3 37.0 - 54.0 fl    MPV 10.2 6.0 - 12.0 fL    Platelets 304 140 - 450 10*3/mm3    Neutrophil % 66.4 42.7 - 76.0 %    Lymphocyte % 21.9 19.6 - 45.3 %    Monocyte % 6.5 5.0 - 12.0 %    Eosinophil % 4.2 0.3 - 6.2 %    Basophil % 0.6 0.0 - 1.5 %    Immature Grans % 0.4 0.0 - 0.5 %    Neutrophils, Absolute 5.38 1.70 - 7.00 10*3/mm3    Lymphocytes, Absolute 1.77 0.70 - 3.10 10*3/mm3    Monocytes, Absolute 0.53 0.10 - 0.90 10*3/mm3    Eosinophils, Absolute 0.34 0.00 - 0.40  10*3/mm3    Basophils, Absolute 0.05 0.00 - 0.20 10*3/mm3    Immature Grans, Absolute 0.03 0.00 - 0.05 10*3/mm3    nRBC 0.0 0.0 - 0.2 /100 WBC         Assessment/Plan       ICD-10-CM ICD-9-CM   1. Low testosterone level in female  R79.89 790.99   2. Abnormal thyroid function test  R94.6 794.5   3. Female hypogonadism  E28.39 256.39         I reviewed and summarized records from Maryam Devlin APRN from current year  and I reviewed / ordered labs.   From review of records :    Pt has female hypogonadism, low female testosterone    Was on  testosterone pellets provided by Dr. Damico every 6 months    Testosterone levels have varied between 75 to 350     She has had improvement of symptoms but uncertainty of safety.    I informed there is no data on safety of testosterone replacement in women . Nevertheless , I can provide replacement with transdermal testosterone that can achieve more consistent testosterone levels without such high peaks.    Start 1% TD testosterone 1 mg daily - hasn't been using too much since it gets on clothes, suggest to apply inner labia, didn't do - just restarted to thigh     Instead of prometrium 300 mg daily - doing 200 mg daily - decrease to 100 mg     Instead of estrogen pellets , try 100 mcg estradiol patch - now using estradiol 20 mg every 30 days, suggest 20 mg every 3 weeks , now I suggest 10 mg every 2 weeks     Continue DIM, stopped , restart and measure estrone             Testosterone , 0.25 g per click   ---      She was on armour but TSH has been normal   TPO ab negative   Recheck thyroid levels - normal     appt every 4 months , labs as often as monthly     --    Has polyarthritis     Workup as below     No orders of the defined types were placed in this encounter.        A copy of my note was sent to Maryam Devlin APRN    Please see my above opinion and suggestions.             This document has been electronically signed by Micky Donald MD on December 11,  2020 08:34 CST

## 2020-12-16 ENCOUNTER — TELEPHONE (OUTPATIENT)
Dept: ENDOCRINOLOGY | Facility: CLINIC | Age: 55
End: 2020-12-16

## 2020-12-16 NOTE — TELEPHONE ENCOUNTER
Pt is needing a script for her     progesterone (PROMETRIUM) 100 MG capsule   90 DAYS     [facility] - [place], [place] - [address] [place] [address] [phone]  - [phone] FX

## 2021-04-30 ENCOUNTER — IMMUNIZATION (OUTPATIENT)
Dept: VACCINE CLINIC | Facility: HOSPITAL | Age: 56
End: 2021-04-30

## 2021-04-30 PROCEDURE — 91300 HC SARSCOV02 VAC 30MCG/0.3ML IM: CPT | Performed by: THORACIC SURGERY (CARDIOTHORACIC VASCULAR SURGERY)

## 2021-04-30 PROCEDURE — 0001A: CPT | Performed by: THORACIC SURGERY (CARDIOTHORACIC VASCULAR SURGERY)

## 2021-05-19 ENCOUNTER — IMMUNIZATION (OUTPATIENT)
Dept: VACCINE CLINIC | Facility: HOSPITAL | Age: 56
End: 2021-05-19

## 2021-05-19 PROCEDURE — 91300 HC SARSCOV02 VAC 30MCG/0.3ML IM: CPT | Performed by: THORACIC SURGERY (CARDIOTHORACIC VASCULAR SURGERY)

## 2021-05-19 PROCEDURE — 0002A: CPT | Performed by: THORACIC SURGERY (CARDIOTHORACIC VASCULAR SURGERY)

## 2021-06-01 DIAGNOSIS — R79.89 LOW TESTOSTERONE LEVEL IN FEMALE: ICD-10-CM

## 2021-06-01 DIAGNOSIS — R94.6 ABNORMAL THYROID FUNCTION TEST: ICD-10-CM

## 2021-06-01 DIAGNOSIS — E28.39 FEMALE HYPOGONADISM: ICD-10-CM

## 2021-06-30 RX ORDER — ESTRADIOL VALERATE 40 MG/ML
20 INJECTION INTRAMUSCULAR
Qty: 1 EACH | Refills: 5 | Status: SHIPPED | OUTPATIENT
Start: 2021-06-30 | End: 2021-08-31 | Stop reason: SDUPTHER

## 2021-08-31 ENCOUNTER — OFFICE VISIT (OUTPATIENT)
Dept: ENDOCRINOLOGY | Facility: CLINIC | Age: 56
End: 2021-08-31

## 2021-08-31 VITALS
BODY MASS INDEX: 26.82 KG/M2 | SYSTOLIC BLOOD PRESSURE: 130 MMHG | HEIGHT: 65 IN | OXYGEN SATURATION: 100 % | HEART RATE: 87 BPM | WEIGHT: 161 LBS | DIASTOLIC BLOOD PRESSURE: 80 MMHG

## 2021-08-31 DIAGNOSIS — E28.39 FEMALE HYPOGONADISM: ICD-10-CM

## 2021-08-31 DIAGNOSIS — R79.89 LOW TESTOSTERONE LEVEL IN FEMALE: Primary | ICD-10-CM

## 2021-08-31 DIAGNOSIS — R94.6 ABNORMAL THYROID FUNCTION TEST: ICD-10-CM

## 2021-08-31 DIAGNOSIS — M13.0 POLYARTHRITIS: ICD-10-CM

## 2021-08-31 PROCEDURE — 99214 OFFICE O/P EST MOD 30 MIN: CPT | Performed by: INTERNAL MEDICINE

## 2021-08-31 RX ORDER — SYRINGE W-NEEDLE,DISPOSAB,3 ML 25GX5/8"
SYRINGE, EMPTY DISPOSABLE MISCELLANEOUS
Qty: 4 EACH | Refills: 11 | Status: SHIPPED | OUTPATIENT
Start: 2021-08-31 | End: 2022-08-29 | Stop reason: SDUPTHER

## 2021-08-31 RX ORDER — ESTRADIOL VALERATE 40 MG/ML
20 INJECTION INTRAMUSCULAR
Qty: 1 EACH | Refills: 5 | Status: SHIPPED | OUTPATIENT
Start: 2021-08-31 | End: 2021-09-20 | Stop reason: SDUPTHER

## 2021-08-31 RX ORDER — PROGESTERONE 100 MG/1
100 CAPSULE ORAL DAILY
Qty: 30 CAPSULE | Refills: 11 | Status: SHIPPED | OUTPATIENT
Start: 2021-08-31 | End: 2022-02-28

## 2021-08-31 NOTE — PROGRESS NOTES
" Magdalene Meza is a 56 y.o. female who presents for  evaluation of   Chief Complaint   Patient presents with   • low testosterone in female       Referring provider     Primary Care Provider    Samia Lu APRN    57 yo female     Low Female Testosterone    Duration, Since age 49 years old at the onset of menopause    Symptoms, Fatigue / Weight Gain - lost but regained 10 lbs     Modifying Factors, on Testosterone Pellets with improvement of symptoms but concerned for safety. swtiched to gel , not using     using estradiol valerate 20 mg every 3 to 4 weeks       PE    /80   Pulse 87   Ht 165.1 cm (65\")   Wt 73 kg (161 lb)   SpO2 100%   BMI 26.79 kg/m²   AOx3  No visible goiter  Normal Respiratory Effort , Lung CTA  RRR  No Edema    Labs    Lab Results   Component Value Date    WBC 8.10 12/01/2020    HGB 14.7 12/01/2020    HCT 42.1 12/01/2020    MCV 85.7 12/01/2020     12/01/2020     Lab Results   Component Value Date    GLUCOSE 98 12/01/2020    BUN 8 12/01/2020    CREATININE 0.85 12/01/2020    EGFRIFNONA 69 12/01/2020    BCR 9.4 12/01/2020    K 3.8 12/01/2020    CO2 27.0 12/01/2020    CALCIUM 9.3 12/01/2020    ALBUMIN 4.80 12/01/2020    AST 21 12/01/2020    ALT 21 12/01/2020           Assessment/Plan       ICD-10-CM ICD-9-CM   1. Low testosterone level in female  R79.89 790.99   2. Female hypogonadism  E28.39 256.39   3. Abnormal thyroid function test  R94.6 794.5   4. Polyarthritis  M13.0 716.50         I reviewed and summarized records from Samia Lu APRN from current year  and I reviewed / ordered labs.   From review of records :    Pt has female hypogonadism, low female testosterone    Testosterone     Pellets by Damico before w levels of 350     1% TD Testosterone , up to 1 mg daily     Progesterone    prometrium 300 mg daily when they started     Now 100 mg daily       Estrogen     Before  estrogen pellets     100 mcg estradiol patch , didn't work     Now estradiol " valerate 20 mg every 2 weeks        Continue DIM, stopped , restart and measure estrone       ---      She was on armour but TSH has been normal   TPO ab negative   Recheck thyroid levels - normal     appt every 4 months , labs as often as monthly     --    Has polyarthritis   Workup as below     Complaints of synovitis        No orders of the defined types were placed in this encounter.        A copy of my note was sent to Samia Lu APRN    Please see my above opinion and suggestions.             This document has been electronically signed by Micky Donald MD on August 31, 2021 08:40 CDT

## 2021-09-20 RX ORDER — ESTRADIOL VALERATE 40 MG/ML
20 INJECTION INTRAMUSCULAR
Qty: 3 ML | Refills: 1 | Status: SHIPPED | OUTPATIENT
Start: 2021-09-20 | End: 2022-01-04

## 2022-01-04 RX ORDER — ESTRADIOL VALERATE 20 MG/ML
INJECTION INTRAMUSCULAR
Qty: 5 ML | Refills: 1 | Status: SHIPPED | OUTPATIENT
Start: 2022-01-04 | End: 2022-02-28 | Stop reason: SDUPTHER

## 2022-02-28 ENCOUNTER — OFFICE VISIT (OUTPATIENT)
Dept: ENDOCRINOLOGY | Facility: CLINIC | Age: 57
End: 2022-02-28

## 2022-02-28 VITALS
WEIGHT: 155 LBS | DIASTOLIC BLOOD PRESSURE: 80 MMHG | HEIGHT: 65 IN | SYSTOLIC BLOOD PRESSURE: 140 MMHG | BODY MASS INDEX: 25.83 KG/M2 | OXYGEN SATURATION: 100 % | HEART RATE: 101 BPM

## 2022-02-28 DIAGNOSIS — E28.39 FEMALE HYPOGONADISM: ICD-10-CM

## 2022-02-28 DIAGNOSIS — R79.89 LOW TESTOSTERONE LEVEL IN FEMALE: Primary | ICD-10-CM

## 2022-02-28 DIAGNOSIS — R94.6 ABNORMAL THYROID FUNCTION TEST: ICD-10-CM

## 2022-02-28 PROCEDURE — 99214 OFFICE O/P EST MOD 30 MIN: CPT | Performed by: INTERNAL MEDICINE

## 2022-02-28 RX ORDER — SYRINGE, DISPOSABLE, 1 ML
SYRINGE, EMPTY DISPOSABLE MISCELLANEOUS
Qty: 4 EACH | Refills: 11 | Status: SHIPPED | OUTPATIENT
Start: 2022-02-28

## 2022-02-28 RX ORDER — ESTRADIOL VALERATE 20 MG/ML
INJECTION INTRAMUSCULAR
Qty: 5 ML | Refills: 11 | Status: SHIPPED | OUTPATIENT
Start: 2022-02-28 | End: 2022-08-29 | Stop reason: SDUPTHER

## 2022-02-28 RX ORDER — PROGESTERONE 100 MG/1
100 CAPSULE ORAL DAILY
Qty: 30 CAPSULE | Refills: 11 | Status: SHIPPED | OUTPATIENT
Start: 2022-02-28 | End: 2022-08-29 | Stop reason: SDUPTHER

## 2022-02-28 NOTE — PROGRESS NOTES
" Magdalene Meza is a 56 y.o. female who presents for  evaluation of   Chief Complaint   Patient presents with   • Low testosterone in female       Referring provider     Primary Care Provider    Samia Lu APRN    55 yo female     Low Female Testosterone    Duration, Since age 49 years old at the onset of menopause    Symptoms, Fatigue / Weight Gain - lost but regained 10 lbs     Modifying Factors, on Testosterone Pellets with improvement of symptoms but concerned for safety. swtiched to gel , not using     using estradiol valerate 20 mg every 3 to 4 weeks       PE    /80   Pulse 101   Ht 165.1 cm (65\")   Wt 70.3 kg (155 lb)   SpO2 100%   BMI 25.79 kg/m²   AOx3  No visible goiter  Normal Respiratory Effort , Lung CTA  RRR  No Edema    Labs    Lab Results   Component Value Date    WBC 8.10 12/01/2020    HGB 14.7 12/01/2020    HCT 42.1 12/01/2020    MCV 85.7 12/01/2020     12/01/2020     Lab Results   Component Value Date    GLUCOSE 98 12/01/2020    BUN 8 12/01/2020    CREATININE 0.85 12/01/2020    EGFRIFNONA 69 12/01/2020    BCR 9.4 12/01/2020    K 3.8 12/01/2020    CO2 27.0 12/01/2020    CALCIUM 9.3 12/01/2020    ALBUMIN 4.80 12/01/2020    AST 21 12/01/2020    ALT 21 12/01/2020           Assessment/Plan       ICD-10-CM ICD-9-CM   1. Low testosterone level in female  R79.89 790.99   2. Female hypogonadism  E28.39 256.39   3. Abnormal thyroid function test  R94.6 794.5         I reviewed and summarized records from Samia Lu APRN from current year  and I reviewed / ordered labs.   From review of records :    Pt has female hypogonadism, low female testosterone    Testosterone     Pellets by Damico before w levels of 350     1% TD Testosterone , up to 1 mg daily     Progesterone    prometrium 300 mg daily when they started     Now 100 mg daily       Estrogen     Before  estrogen pellets     100 mcg estradiol patch , didn't work     Now estradiol valerate 20 mg every 2 " weeks        Continue DIM, stopped , restart and measure estrone       ---      She was on armour but TSH has been normal   TPO ab negative   Recheck thyroid levels - normal     appt every 4 months , labs as often as monthly     --    Has polyarthritis   Workup as below     Complaints of synovitis        No orders of the defined types were placed in this encounter.        A copy of my note was sent to Samia Lu APRN    Please see my above opinion and suggestions.             This document has been electronically signed by Micky Donald MD on February 28, 2022 16:11 CST

## 2022-06-05 ENCOUNTER — HOSPITAL ENCOUNTER (EMERGENCY)
Facility: HOSPITAL | Age: 57
Discharge: HOME | End: 2022-06-05
Admitting: EMERGENCY MEDICINE

## 2022-06-05 VITALS
HEART RATE: 91 BPM | TEMPERATURE: 98.4 F | HEIGHT: 65 IN | RESPIRATION RATE: 16 BRPM | OXYGEN SATURATION: 99 % | BODY MASS INDEX: 24.99 KG/M2 | DIASTOLIC BLOOD PRESSURE: 74 MMHG | SYSTOLIC BLOOD PRESSURE: 133 MMHG | WEIGHT: 150 LBS

## 2022-06-05 DIAGNOSIS — I10 HYPERTENSION, UNSPECIFIED TYPE: Primary | ICD-10-CM

## 2022-06-05 DIAGNOSIS — M79.602 LEFT ARM PAIN: ICD-10-CM

## 2022-06-05 LAB
ALBUMIN SERPL-MCNC: 4 G/DL (ref 3.5–5.2)
ALBUMIN/GLOB SERPL: 1.5 G/DL
ALP SERPL-CCNC: 90 U/L (ref 39–117)
ALT SERPL W P-5'-P-CCNC: 16 U/L (ref 1–33)
ANION GAP SERPL CALCULATED.3IONS-SCNC: 11 MMOL/L (ref 5–15)
AST SERPL-CCNC: 16 U/L (ref 1–32)
BASOPHILS # BLD AUTO: 0.04 10*3/MM3 (ref 0–0.2)
BASOPHILS NFR BLD AUTO: 0.4 % (ref 0–1.5)
BILIRUB SERPL-MCNC: 0.2 MG/DL (ref 0–1.2)
BUN SERPL-MCNC: 12 MG/DL (ref 6–20)
BUN/CREAT SERPL: 13.3 (ref 7–25)
CALCIUM SPEC-SCNC: 8.8 MG/DL (ref 8.6–10.5)
CHLORIDE SERPL-SCNC: 107 MMOL/L (ref 98–107)
CK MB SERPL-CCNC: 5.28 NG/ML
CO2 SERPL-SCNC: 21 MMOL/L (ref 22–29)
CREAT SERPL-MCNC: 0.9 MG/DL (ref 0.57–1)
DEPRECATED RDW RBC AUTO: 37.3 FL (ref 37–54)
EGFRCR SERPLBLD CKD-EPI 2021: 74.3 ML/MIN/1.73
EOSINOPHIL # BLD AUTO: 0.04 10*3/MM3 (ref 0–0.4)
EOSINOPHIL NFR BLD AUTO: 0.4 % (ref 0.3–6.2)
ERYTHROCYTE [DISTWIDTH] IN BLOOD BY AUTOMATED COUNT: 12.3 % (ref 12.3–15.4)
GLOBULIN UR ELPH-MCNC: 2.6 GM/DL
GLUCOSE SERPL-MCNC: 89 MG/DL (ref 65–99)
HCT VFR BLD AUTO: 36.6 % (ref 34–46.6)
HGB BLD-MCNC: 12.7 G/DL (ref 12–15.9)
HOLD SPECIMEN: NORMAL
IMM GRANULOCYTES # BLD AUTO: 0.05 10*3/MM3 (ref 0–0.05)
IMM GRANULOCYTES NFR BLD AUTO: 0.4 % (ref 0–0.5)
LIPASE SERPL-CCNC: 34 U/L (ref 13–60)
LYMPHOCYTES # BLD AUTO: 2.39 10*3/MM3 (ref 0.7–3.1)
LYMPHOCYTES NFR BLD AUTO: 21.3 % (ref 19.6–45.3)
MCH RBC QN AUTO: 29.5 PG (ref 26.6–33)
MCHC RBC AUTO-ENTMCNC: 34.7 G/DL (ref 31.5–35.7)
MCV RBC AUTO: 85.1 FL (ref 79–97)
MONOCYTES # BLD AUTO: 0.88 10*3/MM3 (ref 0.1–0.9)
MONOCYTES NFR BLD AUTO: 7.9 % (ref 5–12)
NEUTROPHILS NFR BLD AUTO: 69.6 % (ref 42.7–76)
NEUTROPHILS NFR BLD AUTO: 7.81 10*3/MM3 (ref 1.7–7)
NRBC BLD AUTO-RTO: 0 /100 WBC (ref 0–0.2)
NT-PROBNP SERPL-MCNC: 163.3 PG/ML (ref 0–900)
PLATELET # BLD AUTO: 310 10*3/MM3 (ref 140–450)
PMV BLD AUTO: 9.6 FL (ref 6–12)
POTASSIUM SERPL-SCNC: 3.6 MMOL/L (ref 3.5–5.2)
PROT SERPL-MCNC: 6.6 G/DL (ref 6–8.5)
RBC # BLD AUTO: 4.3 10*6/MM3 (ref 3.77–5.28)
SODIUM SERPL-SCNC: 139 MMOL/L (ref 136–145)
T4 FREE SERPL-MCNC: 1.04 NG/DL (ref 0.93–1.7)
TROPONIN T SERPL-MCNC: <0.01 NG/ML (ref 0–0.03)
TSH SERPL DL<=0.05 MIU/L-ACNC: 1.24 UIU/ML (ref 0.27–4.2)
WBC NRBC COR # BLD: 11.21 10*3/MM3 (ref 3.4–10.8)
WHOLE BLOOD HOLD COAG: NORMAL

## 2022-06-05 PROCEDURE — 83690 ASSAY OF LIPASE: CPT | Performed by: STUDENT IN AN ORGANIZED HEALTH CARE EDUCATION/TRAINING PROGRAM

## 2022-06-05 PROCEDURE — 36415 COLL VENOUS BLD VENIPUNCTURE: CPT

## 2022-06-05 PROCEDURE — 99282 EMERGENCY DEPT VISIT SF MDM: CPT

## 2022-06-05 PROCEDURE — 93005 ELECTROCARDIOGRAM TRACING: CPT

## 2022-06-05 PROCEDURE — 84439 ASSAY OF FREE THYROXINE: CPT | Performed by: STUDENT IN AN ORGANIZED HEALTH CARE EDUCATION/TRAINING PROGRAM

## 2022-06-05 PROCEDURE — 93010 ELECTROCARDIOGRAM REPORT: CPT | Performed by: INTERNAL MEDICINE

## 2022-06-05 PROCEDURE — 83880 ASSAY OF NATRIURETIC PEPTIDE: CPT | Performed by: STUDENT IN AN ORGANIZED HEALTH CARE EDUCATION/TRAINING PROGRAM

## 2022-06-05 PROCEDURE — 84484 ASSAY OF TROPONIN QUANT: CPT | Performed by: STUDENT IN AN ORGANIZED HEALTH CARE EDUCATION/TRAINING PROGRAM

## 2022-06-05 PROCEDURE — 80050 GENERAL HEALTH PANEL: CPT | Performed by: STUDENT IN AN ORGANIZED HEALTH CARE EDUCATION/TRAINING PROGRAM

## 2022-06-05 PROCEDURE — 82553 CREATINE MB FRACTION: CPT | Performed by: STUDENT IN AN ORGANIZED HEALTH CARE EDUCATION/TRAINING PROGRAM

## 2022-06-05 RX ORDER — BUTALBITAL/ACETAMINOPHEN 50MG-325MG
TABLET ORAL EVERY 4 HOURS PRN
COMMUNITY

## 2022-06-05 RX ORDER — TOPIRAMATE 50 MG/1
50 TABLET, FILM COATED ORAL DAILY
COMMUNITY

## 2022-06-05 RX ORDER — AMLODIPINE BESYLATE 5 MG/1
5 TABLET ORAL DAILY
COMMUNITY

## 2022-06-05 NOTE — ED NOTES
Pt presents to the ED with c/o 2/10 intermittent left arm pain. Pt denies any injury to the arm. Pt also denies any chest pain at this time. Pt is A+OX4 denies any SI/HI

## 2022-06-05 NOTE — ED PROVIDER NOTES
Subjective   Patient is a 57-year-old female who presents to the ER due to left arm pain.  Patient reports that symptoms started originally 4 days ago and it has been intermittent.  Patient decided to come to the ER today because she had another episode of left arm pain that lasted for about an hour.  Symptoms are aggravated by stress.  However today's episode was not triggered by stress.  Patient denies associated SOA, diaphoresis, or palpitations.  Patient is concerned and she wants to rule out cardiac etiology.          Review of Systems   Constitutional: Negative for chills and fever.   HENT: Negative for congestion, rhinorrhea and sore throat.    Eyes: Negative for visual disturbance.   Respiratory: Negative for cough, chest tightness and shortness of breath.    Cardiovascular: Negative for chest pain, palpitations and leg swelling.   Gastrointestinal: Negative for constipation, nausea and vomiting.   Endocrine: Negative for polydipsia and polyuria.   Genitourinary: Negative for difficulty urinating, dysuria, frequency and urgency.   Musculoskeletal: Negative for joint swelling and myalgias.   Skin: Negative for rash and wound.   Neurological: Negative for dizziness, weakness, light-headedness and headaches.   Psychiatric/Behavioral: Negative for behavioral problems, confusion, decreased concentration, dysphoric mood, sleep disturbance and suicidal ideas. The patient is not nervous/anxious.        Past Medical History:   Diagnosis Date   • Allergic rhinitis    • Female hypogonadism 3/17/2020   • GERD (gastroesophageal reflux disease)    • Low testosterone level in female 3/17/2020   • Migraine    • Sinusitis        Allergies   Allergen Reactions   • Ceclor [Cefaclor] Hives   • Diclofenac GI Intolerance     Causes stomach problems.     • Duloxetine Unknown - Low Severity     muscle twitching   • Pregabalin Unknown - Low Severity     Orbital swelling   • Miconazole Rash       Past Surgical History:   Procedure  Laterality Date   • CHOLECYSTECTOMY     • COLONOSCOPY N/A 7/24/2020    Procedure: COLONOSCOPY;  Surgeon: Heath Caballero MD;  Location: [facility] ENDOSCOPY;  Service: General;  Laterality: N/A;   • ENDOMETRIAL ABLATION     • LAPAROSCOPIC TUBAL LIGATION         Family History   Problem Relation Age of Onset   • Cancer Father    • Heart disease Father    • Stroke Father    • Severe sprains Father    • Rashes / Skin problems Father    • Cancer Maternal Grandmother    • Severe sprains Maternal Grandfather    • Heart disease Paternal Grandmother    • Cancer Paternal Grandfather    • Heart disease Paternal Grandfather        Social History     Socioeconomic History   • Marital status:    Tobacco Use   • Smoking status: Never Smoker   • Smokeless tobacco: Never Used   Substance and Sexual Activity   • Alcohol use: Yes   • Drug use: No   • Sexual activity: Defer           Objective   Physical Exam  Vitals and nursing note reviewed.   Constitutional:       General: She is not in acute distress.     Appearance: Normal appearance. She is not diaphoretic.   HENT:      Head: Normocephalic and atraumatic.      Right Ear: External ear normal.      Left Ear: External ear normal.      Nose: Nose normal.   Eyes:      General: No scleral icterus.     Extraocular Movements: Extraocular movements intact.      Conjunctiva/sclera: Conjunctivae normal.      Pupils: Pupils are equal, round, and reactive to light.   Cardiovascular:      Rate and Rhythm: Normal rate and regular rhythm.      Heart sounds: Normal heart sounds.   Pulmonary:      Effort: Pulmonary effort is normal. No respiratory distress.      Breath sounds: Normal breath sounds.   Chest:      Chest wall: No tenderness.   Abdominal:      General: Bowel sounds are normal.      Palpations: Abdomen is soft.      Tenderness: There is no abdominal tenderness.   Musculoskeletal:         General: No swelling or tenderness.      Cervical back: Normal range of motion and neck  supple.      Right lower leg: No edema.      Left lower leg: No edema.   Skin:     General: Skin is warm and dry.      Capillary Refill: Capillary refill takes less than 2 seconds.      Findings: No erythema or rash.   Neurological:      General: No focal deficit present.      Mental Status: She is alert and oriented to person, place, and time.      Motor: No weakness.   Psychiatric:         Behavior: Behavior normal.         Procedures           ED Course  ED Course as of 06/05/22 1745   Sun Jun 05, 2022   1740 Patient updated on lab results.  EKG was also discussed with patient. [FA]      ED Course User Index  [FA] Bob Candelaria MD        No orders to display     Lab Results (last 24 hours)     Procedure Component Value Units Date/Time    CBC & Differential [[id number]]  (Abnormal) Collected: 06/05/22 1633    Specimen: Blood Updated: 06/05/22 1706    Narrative:      The following orders were created for panel order CBC & Differential.  Procedure                               Abnormality         Status                     ---------                               -----------         ------                     CBC Auto Differential[[id number]]        Abnormal            Final result                 Please view results for these tests on the individual orders.    Comprehensive Metabolic Panel [[id number]]  (Abnormal) Collected: 06/05/22 1633    Specimen: Blood Updated: 06/05/22 1727     Glucose 89 mg/dL      BUN 12 mg/dL      Creatinine 0.90 mg/dL      Sodium 139 mmol/L      Potassium 3.6 mmol/L      Chloride 107 mmol/L      CO2 21.0 mmol/L      Calcium 8.8 mg/dL      Total Protein 6.6 g/dL      Albumin 4.00 g/dL      ALT (SGPT) 16 U/L      AST (SGOT) 16 U/L      Alkaline Phosphatase 90 U/L      Total Bilirubin 0.2 mg/dL      Globulin 2.6 gm/dL      A/G Ratio 1.5 g/dL      BUN/Creatinine Ratio 13.3     Anion Gap 11.0 mmol/L      eGFR 74.3 mL/min/1.73      Comment: National Kidney Foundation and American Society of  Nephrology (ASN) Task Force recommended calculation based on the Chronic Kidney Disease Epidemiology Collaboration (CKD-EPI) equation refit without adjustment for race.       Narrative:      GFR Normal >60  Chronic Kidney Disease <60  Kidney Failure <15      Lipase [[id number]]  (Normal) Collected: 06/05/22 1633    Specimen: Blood Updated: 06/05/22 1727     Lipase 34 U/L     BNP [[id number]]  (Normal) Collected: 06/05/22 1633    Specimen: Blood Updated: 06/05/22 1732     proBNP 163.3 pg/mL     Narrative:      Among patients with dyspnea, NT-proBNP is highly sensitive for the detection of acute congestive heart failure. In addition NT-proBNP of <300 pg/ml effectively rules out acute congestive heart failure with 99% negative predictive value.    Results may be falsely decreased if patient taking Biotin.      Troponin [[id number]]  (Normal) Collected: 06/05/22 1633    Specimen: Blood Updated: 06/05/22 1732     Troponin T <0.010 ng/mL     Narrative:      Troponin T Reference Range:  <= 0.03 ng/mL-   Negative for AMI  >0.03 ng/mL-     Abnormal for myocardial necrosis.  Clinicians would have to utilize clinical acumen, EKG, Troponin and serial changes to determine if it is an Acute Myocardial Infarction or myocardial injury due to an underlying chronic condition.       Results may be falsely decreased if patient taking Biotin.      CK-MB [[id number]]  (Normal) Collected: 06/05/22 1633    Specimen: Blood Updated: 06/05/22 1732     CKMB 5.28 ng/mL     Narrative:      Results may be falsely decreased if patient taking Biotin.      TSH [[id number]]  (Normal) Collected: 06/05/22 1633    Specimen: Blood Updated: 06/05/22 1732     TSH 1.240 uIU/mL     T4, Free [[id number]]  (Normal) Collected: 06/05/22 1633    Specimen: Blood Updated: 06/05/22 1732     Free T4 1.04 ng/dL     Narrative:      Results may be falsely increased if patient taking Biotin.      CBC Auto Differential [[id number]]  (Abnormal) Collected: 06/05/22 1633     Specimen: Blood Updated: 06/05/22 1706     WBC 11.21 10*3/mm3      RBC 4.30 10*6/mm3      Hemoglobin 12.7 g/dL      Hematocrit 36.6 %      MCV 85.1 fL      MCH 29.5 pg      MCHC 34.7 g/dL      RDW 12.3 %      RDW-SD 37.3 fl      MPV 9.6 fL      Platelets 310 10*3/mm3      Neutrophil % 69.6 %      Lymphocyte % 21.3 %      Monocyte % 7.9 %      Eosinophil % 0.4 %      Basophil % 0.4 %      Immature Grans % 0.4 %      Neutrophils, Absolute 7.81 10*3/mm3      Lymphocytes, Absolute 2.39 10*3/mm3      Monocytes, Absolute 0.88 10*3/mm3      Eosinophils, Absolute 0.04 10*3/mm3      Basophils, Absolute 0.04 10*3/mm3      Immature Grans, Absolute 0.05 10*3/mm3      nRBC 0.0 /100 WBC                     MDM  Number of Diagnoses or Management Options  Hypertension, unspecified type  Left arm pain  Diagnosis management comments: Patient was seen for left arm pain.  EKG was normal sinus rhythm.  Vitals were significant for elevated blood pressure.  Patient reports that she is under a lot of stress.  Labs including cardiac etiology was ruled out.  Patient advised to follow-up with PCP.       Amount and/or Complexity of Data Reviewed  Clinical lab tests: reviewed  Tests in the medicine section of CPT®: reviewed    Risk of Complications, Morbidity, and/or Mortality  Presenting problems: low  Diagnostic procedures: low  Management options: low    Patient Progress  Patient progress: stable      Final diagnoses:   Hypertension, unspecified type   Left arm pain       ED Disposition  ED Disposition     ED Disposition   Discharge    Condition   Stable    Comment   --             Samia Lu, APRN  [address] [address]  [phone]    In 3 days           Medication List      No changes were made to your prescriptions during this visit.          Bob Candelaria MD  Resident  06/05/22 4410

## 2022-06-09 LAB
QT INTERVAL: 344 MS
QTC INTERVAL: 420 MS

## 2022-08-29 ENCOUNTER — OFFICE VISIT (OUTPATIENT)
Dept: ENDOCRINOLOGY | Facility: CLINIC | Age: 57
End: 2022-08-29

## 2022-08-29 VITALS
OXYGEN SATURATION: 98 % | SYSTOLIC BLOOD PRESSURE: 130 MMHG | WEIGHT: 144 LBS | BODY MASS INDEX: 23.99 KG/M2 | DIASTOLIC BLOOD PRESSURE: 80 MMHG | HEIGHT: 65 IN | HEART RATE: 87 BPM

## 2022-08-29 DIAGNOSIS — R79.89 LOW TESTOSTERONE LEVEL IN FEMALE: Primary | ICD-10-CM

## 2022-08-29 DIAGNOSIS — E28.39 FEMALE HYPOGONADISM: ICD-10-CM

## 2022-08-29 PROCEDURE — 99214 OFFICE O/P EST MOD 30 MIN: CPT | Performed by: INTERNAL MEDICINE

## 2022-08-29 RX ORDER — PROGESTERONE 100 MG/1
100 CAPSULE ORAL DAILY
Qty: 30 CAPSULE | Refills: 11 | Status: SHIPPED | OUTPATIENT
Start: 2022-08-29 | End: 2022-08-29 | Stop reason: SDUPTHER

## 2022-08-29 RX ORDER — SYRINGE W-NEEDLE,DISPOSAB,3 ML 25GX5/8"
SYRINGE, EMPTY DISPOSABLE MISCELLANEOUS
Qty: 4 EACH | Refills: 11 | Status: SHIPPED | OUTPATIENT
Start: 2022-08-29

## 2022-08-29 RX ORDER — PROGESTERONE 100 MG/1
100 CAPSULE ORAL DAILY
Qty: 30 CAPSULE | Refills: 11 | Status: SHIPPED | OUTPATIENT
Start: 2022-08-29

## 2022-08-29 RX ORDER — CONJUGATED ESTROGENS 0.62 MG/G
CREAM VAGINAL DAILY
Qty: 30 G | Refills: 11 | Status: SHIPPED | OUTPATIENT
Start: 2022-08-29

## 2022-08-29 RX ORDER — ESTRADIOL VALERATE 20 MG/ML
INJECTION INTRAMUSCULAR
Qty: 5 ML | Refills: 11 | Status: SHIPPED | OUTPATIENT
Start: 2022-08-29 | End: 2023-03-02 | Stop reason: SDUPTHER

## 2022-08-29 NOTE — PROGRESS NOTES
" Magdalene Meza is a 57 y.o. female who presents for  evaluation of   Chief Complaint   Patient presents with   • Low Testosterone       Referring provider     Primary Care Provider    Samia Lu APRN    89 yo female     Low Female Testosterone    Duration, Since age 49 years old at the onset of menopause    Symptoms, Fatigue / Weight Gain - has lost weight     Modifying Factors, on Testosterone Pellets with improvement of symptoms but concerned for safety. swtiched to gel , not using     using estradiol valerate 20 mg every 2 weeks at some point , now 40 mg every 4 weeks       PE    /80   Pulse 87   Ht 165.1 cm (65\")   Wt 65.3 kg (144 lb)   SpO2 98%   BMI 23.96 kg/m²   AOx3  No visible goiter  Normal Respiratory Effort , Lung CTA  RRR  No Edema    Labs    Lab Results   Component Value Date    WBC 11.21 (H) 06/05/2022    HGB 12.7 06/05/2022    HCT 36.6 06/05/2022    MCV 85.1 06/05/2022     06/05/2022     Lab Results   Component Value Date    GLUCOSE 89 06/05/2022    BUN 12 06/05/2022    CREATININE 0.90 06/05/2022    EGFRIFNONA 69 12/01/2020    BCR 13.3 06/05/2022    K 3.6 06/05/2022    CO2 21.0 (L) 06/05/2022    CALCIUM 8.8 06/05/2022    ALBUMIN 4.00 06/05/2022    AST 16 06/05/2022    ALT 16 06/05/2022           Assessment & Plan       ICD-10-CM ICD-9-CM   1. Low testosterone level in female  R79.89 790.99         I reviewed and summarized records from Samia Lu APRN from current year  and I reviewed / ordered labs.   From review of records :    Pt has female hypogonadism, low female testosterone    Testosterone     Pellets by [employer] before w levels of 350     1% TD Testosterone , up to 1 mg daily   Refilled rx        Progesterone    prometrium 300 mg daily when they started     Now 100 mg daily       Estrogen     Before  estrogen pellets     100 mcg estradiol patch , didn't work     Now estradiol valerate 20 mg every 2 weeks    Add premarin vaginal     Prefers 40 mg IM monthly "     Continue DIM, stopped , restart and measure estrone       ---      She was on armour but TSH has been normal   TPO ab negative   Recheck thyroid levels - normal     appt every 4 months , labs as often as monthly     --    Has polyarthritis   Workup as below     Complaints of synovitis        No orders of the defined types were placed in this encounter.        A copy of my note was sent to Samia Lu APRN    Please see my above opinion and suggestions.             This document has been electronically signed by Micky Donald MD on August 29, 2022 16:17 CDT

## 2023-03-02 ENCOUNTER — TELEMEDICINE (OUTPATIENT)
Dept: ENDOCRINOLOGY | Facility: CLINIC | Age: 58
End: 2023-03-02
Payer: COMMERCIAL

## 2023-03-02 DIAGNOSIS — E28.39 FEMALE HYPOGONADISM: ICD-10-CM

## 2023-03-02 DIAGNOSIS — R79.89 LOW TESTOSTERONE LEVEL IN FEMALE: Primary | ICD-10-CM

## 2023-03-02 PROCEDURE — 99214 OFFICE O/P EST MOD 30 MIN: CPT | Performed by: INTERNAL MEDICINE

## 2023-03-02 RX ORDER — ESTRADIOL VALERATE 20 MG/ML
INJECTION INTRAMUSCULAR
Qty: 5 ML | Refills: 11 | Status: SHIPPED | OUTPATIENT
Start: 2023-03-02

## 2023-03-02 NOTE — PROGRESS NOTES
Magdalene Meza is a 57 y.o. female who presents for  evaluation of     Cc female hypogonadism                                      This was a Telehealth Encounter. Benefits and Disadvantages of a Telehealth Visit were discussed and accepted by patient.  Mode of Visit: Video  Location of patient: Home  You have chosen to receive care through a telehealth visit.  Does the patient consent to use a video/audio connection for your medical care today? Yes  The visit included audio and video interaction. No technical issues occurred during this visit          Referring provider     Primary Care Provider    Samia Lu APRN    58 yo female     Low Female Testosterone    Duration, Since age 49 years old at the onset of menopause    Symptoms, Fatigue / Weight Gain - has lost weight     Modifying Factors, on Testosterone Pellets with improvement of symptoms but concerned for safety switched to gel , not using     using estradiol valerate 20 mg every 2 weeks at some point , now 40 mg every 4 weeks       PE    There were no vitals taken for this visit.  AOx3  No visible goiter  Normal Respiratory Effort , Lung CTA  RRR  No Edema    Labs    Lab Results   Component Value Date    WBC 11.21 (H) 06/05/2022    HGB 12.7 06/05/2022    HCT 36.6 06/05/2022    MCV 85.1 06/05/2022     06/05/2022     Lab Results   Component Value Date    GLUCOSE 89 06/05/2022    BUN 12 06/05/2022    CREATININE 0.90 06/05/2022    EGFRIFNONA 69 12/01/2020    BCR 13.3 06/05/2022    K 3.6 06/05/2022    CO2 21.0 (L) 06/05/2022    CALCIUM 8.8 06/05/2022    ALBUMIN 4.00 06/05/2022    AST 16 06/05/2022    ALT 16 06/05/2022           Assessment & Plan       ICD-10-CM ICD-9-CM   1. Low testosterone level in female  R79.89 790.99   2. Female hypogonadism  E28.39 256.39         I reviewed and summarized records from Samia Lu APRN from current year  and I reviewed / ordered labs.   From review of records :    Pt has female hypogonadism, low  female testosterone    Testosterone     Pellets by [employer]ico before w levels of 350     1% TD Testosterone , up to 1 mg daily   Refilled rx        Progesterone    prometrium 300 mg daily when they started     Now 100 mg daily       Estrogen     Before  estrogen pellets     100 mcg estradiol patch , didn't work     Now estradiol valerate   40 mg IM monthly     Add premarin vaginal     Estradiol 0.01 vaginal         Continue DIM, stopped       ---      She was on armour but TSH has been normal   TPO ab negative   Recheck thyroid levels - normal     appt every 4 months , labs as often as monthly               A copy of my note was sent to Samia Lu APRN    Please see my above opinion and suggestions.             This document has been electronically signed by Micky Donald MD on March 2, 2023 16:55 CST

## 2023-09-07 ENCOUNTER — OFFICE VISIT (OUTPATIENT)
Dept: ENDOCRINOLOGY | Facility: CLINIC | Age: 58
End: 2023-09-07
Payer: COMMERCIAL

## 2023-09-07 VITALS
DIASTOLIC BLOOD PRESSURE: 70 MMHG | BODY MASS INDEX: 23.49 KG/M2 | SYSTOLIC BLOOD PRESSURE: 110 MMHG | HEIGHT: 65 IN | WEIGHT: 141 LBS | HEART RATE: 60 BPM | OXYGEN SATURATION: 100 %

## 2023-09-07 DIAGNOSIS — E28.39 FEMALE HYPOGONADISM: ICD-10-CM

## 2023-09-07 DIAGNOSIS — R79.89 LOW TESTOSTERONE LEVEL IN FEMALE: Primary | ICD-10-CM

## 2023-09-07 DIAGNOSIS — R94.6 ABNORMAL THYROID FUNCTION TEST: ICD-10-CM

## 2023-09-07 RX ORDER — SYRINGE W-NEEDLE,DISPOSAB,3 ML 25GX5/8"
SYRINGE, EMPTY DISPOSABLE MISCELLANEOUS
Qty: 4 EACH | Refills: 11 | Status: SHIPPED | OUTPATIENT
Start: 2023-09-07

## 2023-09-07 RX ORDER — ESTRADIOL VALERATE 20 MG/ML
INJECTION INTRAMUSCULAR
Qty: 5 ML | Refills: 11 | Status: SHIPPED | OUTPATIENT
Start: 2023-09-07

## 2023-09-07 RX ORDER — PROGESTERONE 100 MG/1
100 CAPSULE ORAL DAILY
Qty: 30 CAPSULE | Refills: 11 | Status: SHIPPED | OUTPATIENT
Start: 2023-09-07

## 2023-09-07 NOTE — PROGRESS NOTES
Magdalene Meza is a 58 y.o. female who presents for  evaluation of     Cc female hypogonadism                                            Referring provider     Primary Care Provider    Samai Lu APRN    58 yo female     Low Female Testosterone    Duration, Since age 49 years old at the onset of menopause    Symptoms, Fatigue / Weight Gain - has lost weight     Modifying Factors, was on Testosterone Pellets with improvement of symptoms but concerned for safety switched to gel , not using     using estradiol valerate 20 mg every 2 weeks at some point   now on hold       PE    There were no vitals taken for this visit.  AOx3  No visible goiter  Normal Respiratory Effort , Lung CTA  RRR  No Edema    Labs    Lab Results   Component Value Date    WBC 11.21 (H) 06/05/2022    HGB 12.7 06/05/2022    HCT 36.6 06/05/2022    MCV 85.1 06/05/2022     06/05/2022     Lab Results   Component Value Date    GLUCOSE 89 06/05/2022    BUN 11 05/30/2023    CREATININE 1.0 05/30/2023    EGFRIFNONA 69 12/01/2020    BCR 13.3 06/05/2022    K 4.3 05/30/2023    CO2 29 05/30/2023    CALCIUM 8.4 (L) 05/30/2023    ALBUMIN 4.0 05/30/2023    AST 16 05/30/2023    ALT 17 05/30/2023           Assessment & Plan       ICD-10-CM ICD-9-CM   1. Low testosterone level in female  R79.89 790.99   2. Female hypogonadism  E28.39 256.39   3. Abnormal thyroid function test  R94.6 794.5         I reviewed and summarized records from Samia Lu APRN from current year  and I reviewed / ordered labs.   From review of records :    Pt has female hypogonadism, low female testosterone    Testosterone     Pellets by Damico before w levels of 350     1% TD Testosterone , up to 1 mg daily   Refilled rx        Progesterone    prometrium 300 mg daily when they started     Now 100 mg daily       Estrogen     Before  estrogen pellets     100 mcg estradiol patch , didn't work     Now estradiol valerate   40 mg IM monthly - 20 mg IM every 2 months      Add premarin vaginal , not using       Estradiol 0.01 vaginal         Continue DIM, stopped       ---      She was on armour but TSH has been normal   TPO ab negative   Recheck thyroid levels - normal     appt every 4 months , labs as often as monthly               A copy of my note was sent to Samia Lu APRN    Please see my above opinion and suggestions.             This document has been electronically signed by Micky Donald MD on September 7, 2023 13:06 CDT

## 2025-06-05 NOTE — TELEPHONE ENCOUNTER
PT called requesting refill on testosterone cream, into [facility] and then     [place] [facility] [place]

## (undated) DEVICE — CANN SMPL SOFTECH BIFLO ETCO2 A/M 7FT